# Patient Record
Sex: FEMALE | Race: OTHER | Employment: FULL TIME | URBAN - METROPOLITAN AREA
[De-identification: names, ages, dates, MRNs, and addresses within clinical notes are randomized per-mention and may not be internally consistent; named-entity substitution may affect disease eponyms.]

---

## 2017-02-10 ENCOUNTER — HOSPITAL ENCOUNTER (EMERGENCY)
Age: 22
Discharge: HOME | End: 2017-02-10
Attending: EMERGENCY MEDICINE
Payer: COMMERCIAL

## 2017-02-10 ENCOUNTER — APPOINTMENT (OUTPATIENT)
Dept: ULTRASOUND IMAGING | Age: 22
End: 2017-02-10
Attending: EMERGENCY MEDICINE
Payer: COMMERCIAL

## 2017-02-10 DIAGNOSIS — R10.9 ABDOMINAL PAIN AFFECTING PREGNANCY: Primary | ICD-10-CM

## 2017-02-10 DIAGNOSIS — O26.899 ABDOMINAL PAIN AFFECTING PREGNANCY: Primary | ICD-10-CM

## 2017-02-10 LAB
ABO + RH BLD: NORMAL
ANION GAP BLD CALC-SCNC: 10 MMOL/L (ref 7–16)
BUN SERPL-MCNC: 7 MG/DL (ref 6–23)
CALCIUM SERPL-MCNC: 8.2 MG/DL (ref 8.3–10.4)
CHLORIDE SERPL-SCNC: 107 MMOL/L (ref 98–107)
CO2 SERPL-SCNC: 25 MMOL/L (ref 21–32)
CREAT SERPL-MCNC: 0.82 MG/DL (ref 0.6–1)
ERYTHROCYTE [DISTWIDTH] IN BLOOD BY AUTOMATED COUNT: 12.9 % (ref 11.9–14.6)
GLUCOSE SERPL-MCNC: 90 MG/DL (ref 65–100)
HCG SERPL-ACNC: 221 MIU/ML (ref 0–6)
HCT VFR BLD AUTO: 39.5 % (ref 35.8–46.3)
HGB BLD-MCNC: 13.4 G/DL (ref 11.7–15.4)
MCH RBC QN AUTO: 31.2 PG (ref 26.1–32.9)
MCHC RBC AUTO-ENTMCNC: 33.9 G/DL (ref 31.4–35)
MCV RBC AUTO: 92.1 FL (ref 79.6–97.8)
PLATELET # BLD AUTO: 356 K/UL (ref 150–450)
PMV BLD AUTO: 10.2 FL (ref 10.8–14.1)
POTASSIUM SERPL-SCNC: 3.3 MMOL/L (ref 3.5–5.1)
RBC # BLD AUTO: 4.29 M/UL (ref 4.05–5.25)
SODIUM SERPL-SCNC: 142 MMOL/L (ref 136–145)
WBC # BLD AUTO: 16.5 K/UL (ref 4.3–11.1)

## 2017-02-10 PROCEDURE — 85027 COMPLETE CBC AUTOMATED: CPT | Performed by: EMERGENCY MEDICINE

## 2017-02-10 PROCEDURE — 99284 EMERGENCY DEPT VISIT MOD MDM: CPT | Performed by: EMERGENCY MEDICINE

## 2017-02-10 PROCEDURE — 80048 BASIC METABOLIC PNL TOTAL CA: CPT | Performed by: EMERGENCY MEDICINE

## 2017-02-10 PROCEDURE — 81003 URINALYSIS AUTO W/O SCOPE: CPT | Performed by: EMERGENCY MEDICINE

## 2017-02-10 PROCEDURE — 84702 CHORIONIC GONADOTROPIN TEST: CPT | Performed by: EMERGENCY MEDICINE

## 2017-02-10 PROCEDURE — 76817 TRANSVAGINAL US OBSTETRIC: CPT

## 2017-02-10 PROCEDURE — 86900 BLOOD TYPING SEROLOGIC ABO: CPT | Performed by: EMERGENCY MEDICINE

## 2017-02-11 VITALS
HEART RATE: 89 BPM | BODY MASS INDEX: 31.49 KG/M2 | RESPIRATION RATE: 18 BRPM | TEMPERATURE: 98.7 F | DIASTOLIC BLOOD PRESSURE: 90 MMHG | OXYGEN SATURATION: 99 % | SYSTOLIC BLOOD PRESSURE: 160 MMHG | WEIGHT: 189 LBS | HEIGHT: 65 IN

## 2017-02-11 NOTE — ED PROVIDER NOTES
HPI Comments: Reports positive pregnancy test today and pain in her lower abdomen when she sits up. The pain is mostly located on the left side. It is a dull ache. She is a  with irregular periods secondary to PCOS. Patient is a 19 y.o. female presenting with pelvic pain. The history is provided by the patient. Pelvic Pain    This is a new problem. The current episode started 12 to 24 hours ago. The problem occurs constantly. The problem has not changed since onset. The pain is located in the suprapubic region. The quality of the pain is aching. The pain is mild. Pertinent negatives include no fever, no diarrhea, no nausea, no vomiting, no constipation, no dysuria and no frequency. The pain is worsened by certain positions. The pain is relieved by nothing. Past Medical History:   Diagnosis Date    PCOS (polycystic ovarian syndrome)        History reviewed. No pertinent past surgical history. Family History:   Problem Relation Age of Onset    Hypertension Mother        Social History     Social History    Marital status: SINGLE     Spouse name: N/A    Number of children: N/A    Years of education: N/A     Occupational History    Not on file. Social History Main Topics    Smoking status: Never Smoker    Smokeless tobacco: Never Used    Alcohol use No      Comment: occ    Drug use: Not on file    Sexual activity: Not on file     Other Topics Concern    Not on file     Social History Narrative         ALLERGIES: Pcn [penicillins]    Review of Systems   Constitutional: Negative for chills and fever. Gastrointestinal: Positive for abdominal pain. Negative for constipation, diarrhea, nausea and vomiting. Genitourinary: Negative for dysuria and frequency. All other systems reviewed and are negative.       Vitals:    02/10/17 1954   BP: (!) 161/95   Pulse: 91   Resp: 17   Temp: 98.9 °F (37.2 °C)   SpO2: 99%   Weight: 85.7 kg (189 lb)   Height: 5' 5\" (1.651 m)            Physical Exam   Constitutional: She is oriented to person, place, and time. She appears well-developed and well-nourished. No distress. HENT:   Head: Normocephalic and atraumatic. Cardiovascular: Normal rate and regular rhythm. Pulmonary/Chest: Effort normal and breath sounds normal.   Abdominal: Soft. She exhibits no distension. There is no tenderness. There is no rebound and no guarding. She has no pain with deep palpation   Musculoskeletal: Normal range of motion. She exhibits no edema. Neurological: She is alert and oriented to person, place, and time. No cranial nerve deficit. Skin: Skin is warm and dry. She is not diaphoretic. No erythema. Psychiatric: She has a normal mood and affect. Her behavior is normal.   Nursing note and vitals reviewed. MDM  Number of Diagnoses or Management Options  Abdominal pain affecting pregnancy:   Diagnosis management comments: Her quantitative is only 200 and her ultrasound shows no IUP and no ectopic. I discussed this with her and reasons to return-bleeding or increased pain. I discussed her case with the [facility] hospitalist and recommended she follow up with her regular doctor for repeat hCG on Monday and going up appropriately and above 1 thousand a repeat ultrasound and referral to OB. Discussed this with the patient. Her exam is very benign and her urine is negative. Her WBC is elevated but she is pregnant and with no localizing symptoms I would not CT her at this time. She was told several times to return if worse.          Amount and/or Complexity of Data Reviewed  Clinical lab tests: ordered and reviewed    Risk of Complications, Morbidity, and/or Mortality  Presenting problems: moderate  Diagnostic procedures: moderate  Management options: moderate    Patient Progress  Patient progress: stable    ED Course       Procedures

## 2017-02-11 NOTE — DISCHARGE INSTRUCTIONS
Belly Pain in Pregnancy: Care Instructions  Your Care Instructions  When you're pregnant, any belly pain can be a worry. You may not want to call your doctor about every pain you have. But you don't want to miss something that is dangerous for you or your baby. Even if it feels familiar, belly pain can mean something new when you're pregnant. It's important to know when to call your doctor. It will also help to know how to care for yourself at home when your pain is not caused by anything harmful. · When belly pain is more severe or constant, see a doctor right away. · If you're sure your belly pain is a sign of labor, call your doctor. · When belly pain is brief, it's usually a normal part of pregnancy. It might be related to changes in the growing uterus. Or it could be the stretching of ligaments called round ligaments. These ligaments help support the uterus. Round ligament pain can be on either side of your belly. It can also be felt in your hips or groin. Follow-up care is a key part of your treatment and safety. Be sure to make and go to all appointments, and call your doctor if you are having problems. It's also a good idea to know your test results and keep a list of the medicines you take. How can you tell if belly pain is a sign of labor? When belly pain is caused by labor, it can feel like mild or menstrual-like cramps in your lower belly. These cramps are probably contractions. They can happen in your second or third trimester. You may also have:  · A steady, dull ache in your lower back, pelvis, or thighs. · A feeling of pressure in your pelvis or lower belly. · Changes in your vaginal discharge or a sudden release of fluid from the vagina. If you think you are in labor, call your doctor. How can you care for yourself at home? When belly pain is mild and is not a symptom of labor:  · Rest until you feel better. · Take a warm bath.   · Think about what you drink and eat:  ¨ Drink plenty of fluids. Choose water and other caffeine-free clear liquids until you feel better. ¨ Try eating small, frequent meals. If your stomach is upset, try bland, low-fat foods like plain rice, broiled chicken, toast, and yogurt. · Think about how you move if you are having brief pains from stretching of the round ligaments. ¨ Try gentle stretching. ¨ Move a little more slowly when turning in bed or getting up from a chair, so those ligaments don't stretch quickly. ¨ Lean forward a bit if you think you are going to cough or sneeze. When should you call for help? Call 911 anytime you think you may need emergency care. For example, call if:  · You have sudden, severe pain in your belly. · You have severe vaginal bleeding. Call your doctor now or seek immediate medical care if:  · You have new or worse belly pain or cramping. · You have any vaginal bleeding. · You have a fever. · You have symptoms of preeclampsia, such as:  ¨ Sudden swelling of your face, hands, or feet. ¨ New vision problems (such as dimness or blurring). ¨ A severe headache. · You think that you may be in labor. This means that you've had at least 8 contractions within 1 hour or at least 4 contractions within 20 minutes, even after you change your position and drink fluids. · You have symptoms of a urinary tract infection. These may include:  ¨ Pain or burning when you urinate. ¨ A frequent need to urinate without being able to pass much urine. ¨ Pain in the flank, which is just below the rib cage and above the waist on either side of the back. ¨ Blood in your urine. Watch closely for changes in your health, and be sure to contact your doctor if you are worried about your or your baby's health. Where can you learn more? Go to http://hosea-akosua.info/. Enter [phone] in the search box to learn more about \"Belly Pain in Pregnancy: Care Instructions. \"  Current as of: June 8, 2016  Content Version: 11.1  © 8169-4896 [employer]. Care instructions adapted under license by [employer] (which disclaims liability or warranty for this information). If you have questions about a medical condition or this instruction, always ask your healthcare professional. [place] [address] any warranty or liability for your use of this information.

## 2017-02-11 NOTE — ED TRIAGE NOTES
Pt c/o lower \"pelvic pain\" starting this morning. Pt states she found out she was pregnant this morning via home test. Pt states she was seen at urgent care this evening and they were unable to get a ultrasound. States she was sent here to get an ultrasound. Pt had blood work and urine test completed at urgent care. Pt denies any urinary problems or vaginal bleeding. Pt alert and oriented x 4. Respirations are even and unlabored. Pt appears in no acute distress at this time.

## 2017-02-14 ENCOUNTER — APPOINTMENT (OUTPATIENT)
Dept: ULTRASOUND IMAGING | Age: 22
End: 2017-02-14
Attending: EMERGENCY MEDICINE
Payer: COMMERCIAL

## 2017-02-14 ENCOUNTER — HOSPITAL ENCOUNTER (EMERGENCY)
Age: 22
Discharge: HOME | End: 2017-02-14
Attending: EMERGENCY MEDICINE
Payer: COMMERCIAL

## 2017-02-14 VITALS
TEMPERATURE: 98.5 F | WEIGHT: 187 LBS | HEART RATE: 81 BPM | HEIGHT: 65 IN | OXYGEN SATURATION: 99 % | BODY MASS INDEX: 31.16 KG/M2 | DIASTOLIC BLOOD PRESSURE: 88 MMHG | RESPIRATION RATE: 16 BRPM | SYSTOLIC BLOOD PRESSURE: 145 MMHG

## 2017-02-14 DIAGNOSIS — O20.0 THREATENED MISCARRIAGE IN EARLY PREGNANCY: ICD-10-CM

## 2017-02-14 DIAGNOSIS — R10.9 ACUTE ABDOMINAL PAIN: Primary | ICD-10-CM

## 2017-02-14 LAB
BASOPHILS # BLD AUTO: 0 K/UL (ref 0–0.2)
BASOPHILS # BLD: 0 % (ref 0–2)
DIFFERENTIAL METHOD BLD: ABNORMAL
EOSINOPHIL # BLD: 0.2 K/UL (ref 0–0.8)
EOSINOPHIL NFR BLD: 1 % (ref 0.5–7.8)
ERYTHROCYTE [DISTWIDTH] IN BLOOD BY AUTOMATED COUNT: 12.8 % (ref 11.9–14.6)
HCG SERPL-ACNC: 1606 MIU/ML (ref 0–6)
HCT VFR BLD AUTO: 39.6 % (ref 35.8–46.3)
HGB BLD-MCNC: 13.3 G/DL (ref 11.7–15.4)
IMM GRANULOCYTES # BLD: 0.1 K/UL (ref 0–0.5)
IMM GRANULOCYTES NFR BLD AUTO: 0.3 % (ref 0–5)
LYMPHOCYTES # BLD AUTO: 23 % (ref 13–44)
LYMPHOCYTES # BLD: 4.3 K/UL (ref 0.5–4.6)
MCH RBC QN AUTO: 30.7 PG (ref 26.1–32.9)
MCHC RBC AUTO-ENTMCNC: 33.6 G/DL (ref 31.4–35)
MCV RBC AUTO: 91.5 FL (ref 79.6–97.8)
MONOCYTES # BLD: 1.2 K/UL (ref 0.1–1.3)
MONOCYTES NFR BLD AUTO: 6 % (ref 4–12)
NEUTS SEG # BLD: 13 K/UL (ref 1.7–8.2)
NEUTS SEG NFR BLD AUTO: 70 % (ref 43–78)
PLATELET # BLD AUTO: 370 K/UL (ref 150–450)
PMV BLD AUTO: 10.1 FL (ref 10.8–14.1)
RBC # BLD AUTO: 4.33 M/UL (ref 4.05–5.25)
WBC # BLD AUTO: 18.9 K/UL (ref 4.3–11.1)

## 2017-02-14 PROCEDURE — 99284 EMERGENCY DEPT VISIT MOD MDM: CPT | Performed by: EMERGENCY MEDICINE

## 2017-02-14 PROCEDURE — 85025 COMPLETE CBC W/AUTO DIFF WBC: CPT | Performed by: EMERGENCY MEDICINE

## 2017-02-14 PROCEDURE — 84702 CHORIONIC GONADOTROPIN TEST: CPT | Performed by: EMERGENCY MEDICINE

## 2017-02-14 PROCEDURE — 76817 TRANSVAGINAL US OBSTETRIC: CPT

## 2017-02-14 RX ORDER — NITROFURANTOIN 25; 75 MG/1; MG/1
100 CAPSULE ORAL 2 TIMES DAILY
Qty: 14 CAP | Refills: 0 | Status: SHIPPED | OUTPATIENT
Start: 2017-02-14 | End: 2017-02-21

## 2017-02-14 NOTE — ED PROVIDER NOTES
HPI Comments: Patient states she recently found out that she was pregnant. She was seen here on Friday for abdominal pain and had a positive pregnancy test.  Her Quant was 200+ at that point. An ultrasound was done which showed no intrauterine pregnancy. She had some left lower quadrant pain at that time. It resolved and she went home. Tonight she started hurting on her right side and had some minimal vaginal spotting so she came here again. Elements of this note were created with speech recognition software. As such, there may be errors of speech recognition present. Patient is a 19 y.o. female presenting with pregnancy problem. The history is provided by the patient. Pregnancy Problem    Pertinent negatives include no fever, no nausea and no vomiting. Past Medical History:   Diagnosis Date    PCOS (polycystic ovarian syndrome)        History reviewed. No pertinent past surgical history. Family History:   Problem Relation Age of Onset    Hypertension Mother        Social History     Social History    Marital status: SINGLE     Spouse name: N/A    Number of children: N/A    Years of education: N/A     Occupational History    Not on file. Social History Main Topics    Smoking status: Never Smoker    Smokeless tobacco: Never Used    Alcohol use 0.0 oz/week     0 Standard drinks or equivalent per week      Comment: occ    Drug use: No    Sexual activity: Not on file     Other Topics Concern    Not on file     Social History Narrative         ALLERGIES: Pcn [penicillins]    Review of Systems   Constitutional: Negative for chills and fever. Gastrointestinal: Positive for abdominal pain. Negative for nausea and vomiting. All other systems reviewed and are negative.       Vitals:    02/14/17 0503   BP: (!) 162/96   Pulse: (!) 112   Resp: 16   Temp: 98.6 °F (37 °C)   SpO2: 97%   Weight: 84.8 kg (187 lb)   Height: 5' 5\" (1.651 m)            Physical Exam   Constitutional: She is oriented to person, place, and time. She appears well-developed and well-nourished. HENT:   Head: Normocephalic and atraumatic. Eyes: Conjunctivae are normal. Pupils are equal, round, and reactive to light. Neck: Normal range of motion. Neck supple. Abdominal: Soft. Bowel sounds are normal. There is no tenderness. There is no rebound. Musculoskeletal: She exhibits no edema or tenderness. Neurological: She is alert and oriented to person, place, and time. Skin: Skin is warm and dry. Psychiatric: She has a normal mood and affect. Her behavior is normal.   Nursing note and vitals reviewed. MDM  Number of Diagnoses or Management Options  Diagnosis management comments: differential diagnosis: ectopic pregnancy, threatened miscarriage, DUB  6:04 AM quantitative hCG is increasing, 1600 today. We will get an ultrasound       Amount and/or Complexity of Data Reviewed  Clinical lab tests: ordered and reviewed  Tests in the radiology section of CPT®: ordered    Risk of Complications, Morbidity, and/or Mortality  Presenting problems: moderate  Diagnostic procedures: moderate  Management options: moderate      ED Course       Procedures    The patient was observed in the ED. Patient's condition discussed with the [facility] hospitalist will evaluate in the emergency department. Results Reviewed:      Recent Results (from the past 24 hour(s))   TOTAL HCG, QT.     Collection Time: 02/13/17 11:39 AM   Result Value Ref Range    HCG, beta,  mIU/mL   AMB POC COMPLETE CBC,AUTOMATED ENTER    Collection Time: 02/13/17 11:46 AM   Result Value Ref Range    WBC (POC) 14.1 (A) 4.5 - 10.5 K/uL    RBC (POC) 4.33 4 - 6 M/uL    HGB (POC) 13.8 11 - 18 g/dL    HCT (POC) 40.7 35 - 60 %    MCV (POC) 94.1 80 - 99.9 fL    MCH (POC) 31.8 (A) 27 - 31 pg    MCHC (POC) 33.8 33 - 37 g/dL    RDW (POC) 14.3 (A) 11.6 - 13.7 %    PLATELET (POC) 979 715 - 450 K/uL    MPV (POC) 7.8 7.8 - 11 fL    LYMPHOCYTES (POC) 19.6 (A) 20.5 - 51.1 % ABS. LYMPHS (POC) 2.8 1.2 - 3.4 K/uL    MONOCYTES (POC) 4.4 1.7 - 9.3 %    ABS. MONOS (POC) 0.6 0.1 - 0.6 K/uL    GRANULOCYTES (POC) 76.0 (A) 42.2 - 75.2 %    ABS. GRANS (POC) 10.7 (A) 1.4 - 6.5 K/uL   AMB POC URINALYSIS DIP STICK MANUAL W/ MICRO    Collection Time: 02/13/17 11:58 AM   Result Value Ref Range    Color (UA POC) Yellow     Clarity (UA POC) Slightly Cloudy     Glucose (UA POC) Negative Negative    Bilirubin (UA POC) Negative Negative    Ketones (UA POC) Negative Negative    Specific gravity (UA POC) 1.025 1.001 - 1.035    Blood (UA POC) 1+ Negative    pH (UA POC) 5.0 4.6 - 8.0    Protein (UA POC) Negative Negative mg/dL    Urobilinogen (UA POC) 0.2 mg/dL 0.2 - 1    Nitrites (UA POC) Negative Negative    Leukocyte esterase (UA POC) Trace Negative    WBC 3-6     RBC 0-3     Epithelial Cells 10-20     Casts 0-3     Bacteria (UA POC) Trace Negative    Crystals (UA POC) Negative Negative    Amorphous Crystals 1+     Other: none Negative   CBC WITH AUTOMATED DIFF    Collection Time: 02/14/17  5:10 AM   Result Value Ref Range    WBC 18.9 (H) 4.3 - 11.1 K/uL    RBC 4.33 4.05 - 5.25 M/uL    HGB 13.3 11.7 - 15.4 g/dL    HCT 39.6 35.8 - 46.3 %    MCV 91.5 79.6 - 97.8 FL    MCH 30.7 26.1 - 32.9 PG    MCHC 33.6 31.4 - 35.0 g/dL    RDW 12.8 11.9 - 14.6 %    PLATELET 391 744 - 724 K/uL    MPV 10.1 (L) 10.8 - 14.1 FL    DF AUTOMATED      NEUTROPHILS 70 43 - 78 %    LYMPHOCYTES 23 13 - 44 %    MONOCYTES 6 4.0 - 12.0 %    EOSINOPHILS 1 0.5 - 7.8 %    BASOPHILS 0 0.0 - 2.0 %    IMMATURE GRANULOCYTES 0.3 0.0 - 5.0 %    ABS. NEUTROPHILS 13.0 (H) 1.7 - 8.2 K/UL    ABS. LYMPHOCYTES 4.3 0.5 - 4.6 K/UL    ABS. MONOCYTES 1.2 0.1 - 1.3 K/UL    ABS. EOSINOPHILS 0.2 0.0 - 0.8 K/UL    ABS. BASOPHILS 0.0 0.0 - 0.2 K/UL    ABS. IMM. GRANS. 0.1 0.0 - 0.5 K/UL   TOTAL HCG, QT.     Collection Time: 02/14/17  5:10 AM   Result Value Ref Range    Beta HCG, QT 1606 (H) 0.0 - 6.0 MIU/ML       US PREG UTS LTD   Final Result   IMPRESSION: Negative for ultrasonographic evidence of intrauterine pregnancy. No extraovarian adnexal masses. Early follow-up as clinically indicated   recommended.

## 2017-02-14 NOTE — H&P
DOS 16  CC abdominal pain/ pregnancy    HPI:  19 y.o.  G1 at by  Presents with c/o abdominal pain  Pt presented to ED on 2/10 with left pelvic pain and beta quant of 200; today she has right pelvic pain and beta quant of 1600. Pain is dull and sharp, radiating down leg, intermittent, worse with lying  Down, cant sleep with this, no GI sx, no fevers, able to walk, appetite is normal    US images reviewed by myself- thickened endometrium without sign of GS, nl ovaries, no free fluid, no ectopic seen. Pt desires pregnancy, no h/o PID, no h/o STI, no h/o ectopic. Was not on any contraception, FOB not currently involved. Has some dysuria, some hematuria, was evaluated yesterday and UA was done    ROS  GEN neg  HEENT  neg  GI neg   neg  CVneg  GYN neg    OBHx  OB History    Para Term  AB SAB TAB Ectopic Multiple Living   1               # Outcome Date GA Lbr Fly/2nd Weight Sex Delivery Anes PTL Lv   1 Current                     GYNhx:  +h/o abnl pap - nl f/u pap  No h/o STI  PCO- irregular cycles, has not been on any contraception, but continues on metformin     PMHx:  Past Medical History   Diagnosis Date    PCOS (polycystic ovarian syndrome)    Has h/o UTI (?) or IC       PSHx:  History reviewed. No pertinent past surgical history. FHX:  Family History   Problem Relation Age of Onset    Hypertension Mother        SHx:  Social History     Occupational History    Not on file. Social History Main Topics    Smoking status: Never Smoker    Smokeless tobacco: Never Used    Alcohol use 0.0 oz/week     0 Standard drinks or equivalent per week      Comment: occ    Drug use: No    Sexual activity: Not on file     History   Sexual Activity    Sexual activity: Not on file   one partner      Allergies  Allergies   Allergen Reactions    Pcn [Penicillins] Swelling     TONGUE SWELLS       Med  Prior to Admission medications    Medication Sig Start Date End Date Taking?  Authorizing Provider   FLUoxetine (PROZAC) 20 mg capsule Take 20 mg by mouth daily. 17  Yes Historical Provider   amitriptyline (ELAVIL) 50 mg tablet Take 50 mg by mouth nightly. 17  Yes Historical Provider   metFORMIN (GLUCOPHAGE) 500 mg tablet TAKE 1 TABLET BY MOUTH TWICE A DAY WITH MEAL FOR POLYCYSTIC OVARIAN SYNDROME 16  Yes Tammy Vickers, CHRISTINE         PE  Patient Vitals for the past 24 hrs:   BP   17 0702 140/90   17 0503 (!) 162/96     Temp (24hrs), Av.6 °F (37 °C), Min:98.6 °F (37 °C), Max:98.6 °F (37 °C)    GEN NAD  LUNGS  nonl labored respirations  ABD soft, TTP RLQ, no rebound/guarding, fundus not palpable  EXT no edema   deferred    US images reviewd personally, thickened ES, nl ovaries bilaterally, no FF,m no GS, no ectopic    Prenatal Labs  UA reviewed  O+    A/  19 y.o. G1 with early pregnancy with unknown location  Plan/   1. PUL- Advised that she is just now at discriminatory zone and may need a few more days for IUP to become obvious. No RF for ectopic, desired pregnancy. Will repeat US end of this week, has appt already. Pt given exctopic precautions and is reliable. 2. Early pregnancy- advsied will need to continue metformin, stop elavil, will need early 1hr and ASA 81mg starting at 12 weeks  3. Likely UTI- could be cause of mild-mod pain, will do macrobid BID x 7 days. Thank you for the consult, will f/u with primary OB and for US this week to confirm viability.        Teresa Mckeon MD

## 2017-02-14 NOTE — DISCHARGE INSTRUCTIONS
Threatened Miscarriage: Care Instructions  Your Care Instructions    Some women have light spotting or bleeding during the first 12 weeks of pregnancy. In some cases this is normal. Light spotting or bleeding can also be a sign of a possible loss of the pregnancy. This is called a threatened miscarriage. At this point, the doctor may not be able to tell if your vaginal bleeding is normal or is a sign of a miscarriage. In early pregnancy, things such as stress, exercise, and sex do not cause miscarriage. You may be worried or upset about the possibility of losing your pregnancy. But do not blame yourself. There is no treatment to stop a threatened miscarriage. If you do have a miscarriage, there was nothing you could have done to prevent it. A miscarriage usually means that the pregnancy is not developing normally. The doctor has checked you carefully, but problems can develop later. If you notice any problems or new symptoms, get medical treatment right away. Follow-up care is a key part of your treatment and safety. Be sure to make and go to all appointments, and call your doctor if you are having problems. It's also a good idea to know your test results and keep a list of the medicines you take. How can you care for yourself at home? · If you do have a miscarriage, you will probably have some vaginal bleeding for 1 to 2 weeks. Use pads instead of tampons. · Take acetaminophen (Tylenol) for cramps. Read and follow all instructions on the label. · Do not take two or more pain medicines at the same time unless the doctor told you to. Many pain medicines have acetaminophen, which is Tylenol. Too much acetaminophen (Tylenol) can be harmful. · Do not have sex until your doctor says it is okay. · Get lots of rest over the next several days. · You may do your normal activities if you feel well enough to do them. But do not do any heavy exercise until your doctor says it is okay.   · Eat a balanced diet that is high in iron and vitamin C. Foods rich in iron include red meat, shellfish, eggs, beans, and leafy green vegetables. Foods high in vitamin C include citrus fruits, tomatoes, and broccoli. Talk to your doctor about whether you need to take iron pills or a multivitamin. · Do not drink alcohol or use tobacco or illegal drugs. · Do not smoke. If you need help quitting, talk to your doctor about stop-smoking programs and medicines. These can increase your chances of quitting for good. When should you call for help? Call 911 anytime you think you may need emergency care. For example, call if:  · You have sudden, severe pain in your belly or pelvis. · You passed out (lost consciousness). · You have severe vaginal bleeding. Call your doctor now or seek immediate medical care if:  · You are dizzy or lightheaded, or you feel like you may faint. · You have new or increased pain in your belly or pelvis. · Your vaginal bleeding is getting worse. · You have increased pain in the vaginal area. · You have a fever. · You think you may have passed tissue. Save any tissue that you pass. Take it to your doctor's office as soon as you can. Watch closely for changes in your health, and be sure to contact your doctor if:  · You have new or worse vaginal discharge. · You do not get better as expected. Where can you learn more? Go to http://hosea-akosua.info/. Enter B962 in the search box to learn more about \"Threatened Miscarriage: Care Instructions. \"  Current as of: May 30, 2016  Content Version: 11.1  © 8594-7465 Healthwise, Incorporated. Care instructions adapted under license by [employer] (which disclaims liability or warranty for this information). If you have questions about a medical condition or this instruction, always ask your healthcare professional. [address] any warranty or liability for your use of this information.          Abdominal Pain: Care Instructions  Your Care Instructions    Abdominal pain has many possible causes. Some aren't serious and get better on their own in a few days. Others need more testing and treatment. If your pain continues or gets worse, you need to be rechecked and may need more tests to find out what is wrong. You may need surgery to correct the problem. Don't ignore new symptoms, such as fever, nausea and vomiting, urination problems, pain that gets worse, and dizziness. These may be signs of a more serious problem. Your doctor may have recommended a follow-up visit in the next 8 to 12 hours. If you are not getting better, you may need more tests or treatment. The doctor has checked you carefully, but problems can develop later. If you notice any problems or new symptoms, get medical treatment right away. Follow-up care is a key part of your treatment and safety. Be sure to make and go to all appointments, and call your doctor if you are having problems. It's also a good idea to know your test results and keep a list of the medicines you take. How can you care for yourself at home? · Rest until you feel better. · To prevent dehydration, drink plenty of fluids, enough so that your urine is light yellow or clear like water. Choose water and other caffeine-free clear liquids until you feel better. If you have kidney, heart, or liver disease and have to limit fluids, talk with your doctor before you increase the amount of fluids you drink. · If your stomach is upset, eat mild foods, such as rice, dry toast or crackers, bananas, and applesauce. Try eating several small meals instead of two or three large ones. · Wait until 48 hours after all symptoms have gone away before you have spicy foods, alcohol, and drinks that contain caffeine. · Do not eat foods that are high in fat. · Avoid anti-inflammatory medicines such as aspirin, ibuprofen (Advil, Motrin), and naproxen (Aleve). These can cause stomach upset.  Talk to your doctor if you take daily aspirin for another health problem. When should you call for help? Call 911 anytime you think you may need emergency care. For example, call if:  · You passed out (lost consciousness). · You pass maroon or very bloody stools. · You vomit blood or what looks like coffee grounds. · You have new, severe belly pain. Call your doctor now or seek immediate medical care if:  · Your pain gets worse, especially if it becomes focused in one area of your belly. · You have a new or higher fever. · Your stools are black and look like tar, or they have streaks of blood. · You have unexpected vaginal bleeding. · You have symptoms of a urinary tract infection. These may include:  ¨ Pain when you urinate. ¨ Urinating more often than usual.  ¨ Blood in your urine. · You are dizzy or lightheaded, or you feel like you may faint. Watch closely for changes in your health, and be sure to contact your doctor if:  · You are not getting better after 1 day (24 hours). Where can you learn more? Go to http://hoseaYokeakosua.info/. Enter R074 in the search box to learn more about \"Abdominal Pain: Care Instructions. \"  Current as of: May 27, 2016  Content Version: 11.1  © 2599-3811 [employer]. Care instructions adapted under license by [employer] (which disclaims liability or warranty for this information). If you have questions about a medical condition or this instruction, always ask your healthcare professional. [place] [address] any warranty or liability for your use of this information. Suspected Ectopic Pregnancy: Care Instructions  Your Care Instructions    Your doctor thinks you may have an ectopic pregnancy. This means that a fertilized egg has attached to a place outside the uterus. In most of these cases, the egg grows in a fallopian tube. This is also called a tubal pregnancy.  In rare cases, the egg grows in an ovary or another place in the belly. An ectopic pregnancy cannot develop normally. It can be painful and very dangerous. In some cases, the ectopic pregnancy ends and the body absorbs it over time. If so, treatment is not needed. Your doctor is sending you home to watch for belly pain or bleeding. Call your doctor right away if you have any new or increased pain or bleeding. You also may need to come back for more tests. If an ectopic pregnancy does not end on its own, the only treatment is medicine or surgery to end the pregnancy. An ectopic pregnancy can be very upsetting. But you should not blame yourself. You could not have done anything to prevent it. The doctor has checked you carefully. But problems can develop later. If you notice any problems or new symptoms, get medical treatment right away. Follow-up care is a key part of your treatment and safety. Be sure to make and go to all appointments, and call your doctor if you are having problems. It's also a good idea to know your test results and keep a list of the medicines you take. How can you care for yourself at home? · Rest when you feel tired. You may be more tired than normal for a few weeks. · If you are treated with methotrexate:  ¨ Your doctor will let you know if you can take over-the-counter pain medicine, such as acetaminophen (Tylenol), ibuprofen (Advil, Motrin), or naproxen (Aleve). Read and follow all instructions on the label. ¨ Do not take two or more pain medicines at the same time unless the doctor told you to. Many pain medicines have acetaminophen, which is Tylenol. Too much acetaminophen (Tylenol) can be harmful. ¨ Do not drink alcohol. ¨ Do not take vitamins that contain folic acid, such as prenatal vitamins. · Use pads instead of tampons until your doctor says it is okay. · It may help to talk with family, friends, or a counselor if you are having trouble dealing with the loss of your pregnancy.  If you feel sad or depressed for longer than 2 weeks, talk to a counselor or your doctor. · Do not have sex until your doctor says it is okay. · Talk with your doctor about any future pregnancy plans. When should you call for help? Call 911 anytime you think you may need emergency care. For example, call if:  · You have sudden, severe pain in your belly or pelvis. · You passed out (lost consciousness). · You have severe vaginal bleeding. Call your doctor now or seek immediate medical care if:  · You are dizzy or lightheaded, or you feel like you may faint. · You have new or increased pain in your belly or pelvis. · Your vaginal bleeding is getting worse. · You have increased pain in the vaginal area. · You have a fever. Watch closely for changes in your health, and be sure to contact your doctor if:  · You have new or worse vaginal discharge. · You do not get better as expected. Where can you learn more? Go to http://hosea-akosua.info/. Enter Q902 in the search box to learn more about \"Suspected Ectopic Pregnancy: Care Instructions. \"  Current as of: May 30, 2016  Content Version: 11.1  © 7059-8203 [employer]. Care instructions adapted under license by [employer] (which disclaims liability or warranty for this information). If you have questions about a medical condition or this instruction, always ask your healthcare professional. [address] any warranty or liability for your use of this information.

## 2017-02-14 NOTE — ED NOTES
Advised pt that she is going to have an ultrasound and needs to have a full bladder. Ice water given.

## 2017-02-14 NOTE — LETTER
[facility] EMERGENCY DEPT 
[facility] [address] [facility] [id number] [phone] Work/School Note Date: 2/14/2017 To Whom It May concern: Moises Forte was seen and treated today in the emergency room by the following provider(s): 
Attending Provider: Opal Rodriguez MD.   
 
Moises Forte may return to work on 2/15/17. Sincerely, Carol Ledbetter RN

## 2017-02-14 NOTE — ED NOTES
Pt returned from ultrasound. No complaints verbalized. Informed pt we are waiting on results of ultrasound.

## 2017-02-14 NOTE — ED NOTES
Pt presents to ER for pain to R LQ that began tonight, she was seen at [facility] on Fri and was Dx w/ very early pregnancy w/ . Pt grew concerned that the pain moved from the L LQ to her R now. Pt stated that she has not taken any of her routine meds since determining pregnancy on Fri including her metformin for her PCOS. Was seen by her PCP yesterday but states that her HCG was not back yet and wanted to be checked, MD notified, orders placed.

## 2017-05-19 ENCOUNTER — HOSPITAL ENCOUNTER (EMERGENCY)
Age: 22
Discharge: HOME | End: 2017-05-19
Attending: EMERGENCY MEDICINE
Payer: COMMERCIAL

## 2017-05-19 VITALS
WEIGHT: 192 LBS | SYSTOLIC BLOOD PRESSURE: 125 MMHG | HEIGHT: 65 IN | OXYGEN SATURATION: 100 % | DIASTOLIC BLOOD PRESSURE: 80 MMHG | BODY MASS INDEX: 31.99 KG/M2 | RESPIRATION RATE: 18 BRPM | TEMPERATURE: 98.1 F | HEART RATE: 75 BPM

## 2017-05-19 DIAGNOSIS — S05.02XA CORNEAL ABRASION, LEFT, INITIAL ENCOUNTER: ICD-10-CM

## 2017-05-19 DIAGNOSIS — H20.00 ACUTE IRITIS OF LEFT EYE: Primary | ICD-10-CM

## 2017-05-19 PROCEDURE — 99284 EMERGENCY DEPT VISIT MOD MDM: CPT | Performed by: EMERGENCY MEDICINE

## 2017-05-19 PROCEDURE — 74011000250 HC RX REV CODE- 250: Performed by: EMERGENCY MEDICINE

## 2017-05-19 PROCEDURE — 74011250637 HC RX REV CODE- 250/637: Performed by: EMERGENCY MEDICINE

## 2017-05-19 RX ORDER — IBUPROFEN 800 MG/1
800 TABLET ORAL
Status: COMPLETED | OUTPATIENT
Start: 2017-05-19 | End: 2017-05-19

## 2017-05-19 RX ORDER — IBUPROFEN 800 MG/1
800 TABLET ORAL
Qty: 20 TAB | Refills: 0 | Status: SHIPPED | OUTPATIENT
Start: 2017-05-19 | End: 2017-05-26

## 2017-05-19 RX ORDER — TROPICAMIDE 10 MG/ML
2 SOLUTION/ DROPS OPHTHALMIC
Status: COMPLETED | OUTPATIENT
Start: 2017-05-19 | End: 2017-05-19

## 2017-05-19 RX ORDER — TETRACAINE HYDROCHLORIDE 5 MG/ML
1 SOLUTION OPHTHALMIC
Status: COMPLETED | OUTPATIENT
Start: 2017-05-19 | End: 2017-05-19

## 2017-05-19 RX ORDER — HYDROCODONE BITARTRATE AND ACETAMINOPHEN 5; 325 MG/1; MG/1
1-2 TABLET ORAL
Qty: 20 TAB | Refills: 0 | Status: SHIPPED | OUTPATIENT
Start: 2017-05-19 | End: 2017-10-16 | Stop reason: ALTCHOICE

## 2017-05-19 RX ORDER — ERYTHROMYCIN 5 MG/G
OINTMENT OPHTHALMIC
Status: COMPLETED | OUTPATIENT
Start: 2017-05-19 | End: 2017-05-19

## 2017-05-19 RX ADMIN — TETRACAINE HYDROCHLORIDE 1 DROP: 5 SOLUTION OPHTHALMIC at 04:16

## 2017-05-19 RX ADMIN — FLUORESCEIN SODIUM 1 STRIP: 1 STRIP OPHTHALMIC at 04:16

## 2017-05-19 RX ADMIN — TROPICAMIDE 2 DROP: 10 SOLUTION/ DROPS OPHTHALMIC at 04:46

## 2017-05-19 RX ADMIN — ERYTHROMYCIN: 5 OINTMENT OPHTHALMIC at 05:02

## 2017-05-19 RX ADMIN — IBUPROFEN 800 MG: 800 TABLET, FILM COATED ORAL at 04:40

## 2017-05-19 NOTE — ED PROVIDER NOTES
Patient is a 19 y.o. female presenting with eye injury. The history is provided by the patient. Eye Injury    This is a new problem. The current episode started 6 to 12 hours ago. The problem occurs constantly. The problem has been rapidly worsening. The left eye is affected. The injury mechanism was a direct trauma. The pain is moderate. There is history of trauma (patient was changing a light bulb on a floor lamp when it fell striking her in the left eye) to the eye. Associated symptoms include blurred vision, decreased vision, foreign body sensation, photophobia and pain. Pertinent negatives include no discharge and no eye redness. She has tried nothing for the symptoms. Past Medical History:   Diagnosis Date    Abnormal Papanicolaou smear of cervix     no treatment    Depression     History of chicken pox     Migraines     PCOS (polycystic ovarian syndrome)        Past Surgical History:   Procedure Laterality Date    HX WISDOM TEETH EXTRACTION           Family History:   Problem Relation Age of Onset    Hypertension Father 37   Donia [place] Elevated Lipids Father     Cancer Father      melanoma x 2    Diabetes Paternal Grandfather        Social History     Social History    Marital status: SINGLE     Spouse name: N/A    Number of children: N/A    Years of education: N/A     Occupational History    Not on file. Social History Main Topics    Smoking status: Never Smoker    Smokeless tobacco: Never Used    Alcohol use 0.0 oz/week     0 Standard drinks or equivalent per week      Comment: occ    Drug use: No    Sexual activity: Yes     Partners: Male     Birth control/ protection: None     Other Topics Concern    Not on file     Social History Narrative         ALLERGIES: Pcn [penicillins]    Review of Systems   Eyes: Positive for blurred vision, photophobia and pain. Negative for discharge and redness.        Vitals:    05/19/17 0414   BP: (!) 155/100   Pulse: 79   Resp: 18   Temp: 98.1 °F (36.7 °C)   SpO2: 99%   Weight: 87.1 kg (192 lb)   Height: 5' 5\" (1.651 m)            Physical Exam   Constitutional: She is oriented to person, place, and time. She appears well-developed and well-nourished. She appears distressed. HENT:   Head: Normocephalic and atraumatic. Eyes: Conjunctivae and EOM are normal. Pupils are equal, round, and reactive to light. Right eye exhibits no discharge. Left eye exhibits no discharge. Consensual photophobia noted   Neck: Normal range of motion. Neck supple. Pulmonary/Chest: Effort normal. No respiratory distress. Musculoskeletal: Normal range of motion. Neurological: She is alert and oriented to person, place, and time. She has normal strength. She exhibits normal muscle tone. cni 2-12 grossly  Nl gait,  Nl speech     Skin: Skin is warm and dry. No rash noted. She is not diaphoretic. Psychiatric: She has a normal mood and affect. Her behavior is normal.   Nursing note and vitals reviewed. MDM  Number of Diagnoses or Management Options  Acute iritis of left eye:   Corneal abrasion, left, initial encounter:   Diagnosis management comments: Medical decision making note:  Acute iritis accompanying left corneal abrasion  Dilated with tropicamide, 1%, 2 drops, ×3 treatments 5 minutes apart. ( 6 drops total) good mydriasis achieved  Erythromycin eye ointment, and Motrin 800 administered in the ER  Norco 5/and Motrin 800 prescribed  Follow-up with her optometrist or ophthalmologist today. Note provided  This concludes the \"medical decision making note\" part of this emergency department visit note.       ED Course       Procedures

## 2017-05-19 NOTE — ED NOTES
Gave 3rd set of 2 drops of tropicamide.  Pt verbalized understanding of discharge instructions and prescriptions, signed form and ambualted out in nad

## 2017-05-19 NOTE — ED TRIAGE NOTES
Pt presents to ER after a hanging lamp fell omn her face when changing a light bulb at 2300 tonight. Pt states that she was hurting after the injury but woke 30 min ago w/ increased pain, no OTC meds attempted prior to bed tonight.

## 2017-05-19 NOTE — DISCHARGE INSTRUCTIONS
Apply antibiotic ointment to left eyes demonstrated 3 times a day  Follow-up with your optometrist, or our ophthalmologist, today  Pain medicines as needed       Corneal Scratches: Care Instructions  Your Care Instructions    The cornea is the clear surface that covers the front of the eye. When a speck of dirt, a wood chip, an insect, or another object flies into your eye, it can cause a painful scratch on the cornea. Wearing contact lenses too long or rubbing your eyes can also scratch the cornea. Small scratches usually heal in a day or two. Deeper scratches may take longer. If you have had a foreign object removed from your eye or you have a corneal scratch, you will need to watch for infection and vision problems while your eye heals. Follow-up care is a key part of your treatment and safety. Be sure to make and go to all appointments, and call your doctor if you are having problems. Its also a good idea to know your test results and keep a list of the medicines you take. How can you care for yourself at home? · The doctor probably used a medicine during your exam to numb your eye. When it wears off in 30 to 60 minutes, your eye pain may come back. Take pain medicines exactly as directed. ¨ If the doctor gave you a prescription medicine for pain, take it as prescribed. ¨ If you are not taking a prescription pain medicine, ask your doctor if you can take an over-the-counter medicine. ¨ Do not take two or more pain medicines at the same time unless the doctor told you to. Many pain medicines have acetaminophen, which is Tylenol. Too much acetaminophen (Tylenol) can be harmful. · Do not rub your injured eye. Rubbing can make it worse. · Use the prescribed eyedrops or ointment as directed. Be sure the dropper or bottle tip is clean. To put in eyedrops or ointment:  ¨ Tilt your head back, and pull your lower eyelid down with one finger. ¨ Drop or squirt the medicine inside the lower lid.   ¨ Close your eye for 30 to 60 seconds to let the drops or ointment move around. ¨ Do not touch the ointment or dropper tip to your eyelashes or any other surface. · Do not use your contact lens in your hurt eye until your doctor says you can. Also, do not wear eye makeup until your eye has healed. · Do not drive if you have blurred vision. · Bright light may hurt. Sunglasses can help. · To prevent eye injuries in the future, wear safety glasses or goggles when you work with machines or tools, mow the lawn, or ride a bike or motorcycle. When should you call for help? Call your doctor now or seek immediate medical care if:  · You have any changes in your vision, flashes of light, or floaters (shadows or dark objects that float across your field of vision). · Pain or redness gets worse, or there is yellow, green, or bloody pus coming from the eye. · You have a fever. Watch closely for changes in your health, and be sure to contact your doctor if you have any problems. Where can you learn more? Go to http://hosea-akosua.info/. Enter C643 in the search box to learn more about \"Corneal Scratches: Care Instructions. \"  Current as of: May 23, 2016  Content Version: 11.2  © 8427-5699 [employer]. Care instructions adapted under license by [employer] (which disclaims liability or warranty for this information). If you have questions about a medical condition or this instruction, always ask your healthcare professional. [place] [address] any warranty or liability for your use of this information. Iritis: Care Instructions  Your Care Instructions    Iritis is an inflammation of the colored part of the eye. This part of the eye is called the iris. Iritis can cause redness and pain. It can make your eyes more sensitive to light. And it may make your pupils different sizes. Iritis is most often treated with prescription eyedrops.  Treatment can usually prevent long-term problems with vision. Iritis usually lasts 6 to 8 weeks. You will need follow-up care with an eye doctor (ophthalmologist). Uveitis (say \"you-vee-EYE-tus\") and iridocyclitis (say \"lbh-ru-mxm-ijeoma-KLY-tus\") are other terms used to refer to this problem. Follow-up care is a key part of your treatment and safety. Be sure to make and go to all appointments, and call your doctor if you are having problems. It's also a good idea to know your test results and keep a list of the medicines you take. How can you care for yourself at home? · If the doctor gave you eyedrops, use them exactly as directed. Use the medicine for as long as instructed, even if your eye starts to look better soon. Call your doctor if you think you are having a problem with your eyedrops. Wash your hands well before and after you put in eyedrops. · To put in eyedrops or ointment:  ¨ Tilt your head back, and pull your lower eyelid down with one finger. ¨ Drop or squirt the medicine inside the lower lid. ¨ Close your eye for 30 to 60 seconds to let the drops or ointment move around. ¨ Do not touch the ointment or dropper tip to your eyelashes or any other surface. · Take an over-the-counter pain medicine, such as acetaminophen (Tylenol), ibuprofen (Advil, Motrin), or naproxen (Aleve). Read and follow all instructions on the label. · Make sure you go to all of your follow-up appointments. You will need a complete eye exam from an eye doctor. When should you call for help? Call your doctor now or seek immediate medical care if:  · You have new or increasing eye pain. · You have vision changes in either eye. Watch closely for changes in your health, and be sure to contact your doctor if:  · You have new or worse symptoms. · You do not get better as expected. Where can you learn more? Go to http://hosea-akosua.info/. Enter B112 in the search box to learn more about \"Iritis: Care Instructions. \"  Current as of: May 23, 2016  Content Version: 11.2  © 7433-8517 [employer]. Care instructions adapted under license by [employer] (which disclaims liability or warranty for this information). If you have questions about a medical condition or this instruction, always ask your healthcare professional. [address] any warranty or liability for your use of this information.

## 2017-05-19 NOTE — LETTER
[address] [facility] EMERGENCY DEPT 
[address] [place] [id number] 
[phone] Work/School Note Date: 5/19/2017 To Whom It May concern: Jose Guadalupe Landry was seen and treated today in the emergency room by the following provider(s): 
Attending Provider: Julita Parekh MD.   
 
Jose Guadalupe Landry may return to work on 5/22/17.  
 
Sincerely, 
 
 
 
 
Julita Parekh MD

## 2018-02-08 PROBLEM — G47.09 OTHER INSOMNIA: Status: ACTIVE | Noted: 2018-02-08

## 2018-02-08 PROBLEM — F32.A ANXIETY AND DEPRESSION: Status: ACTIVE | Noted: 2018-02-08

## 2018-02-08 PROBLEM — F90.0 ATTENTION DEFICIT HYPERACTIVITY DISORDER (ADHD), PREDOMINANTLY INATTENTIVE TYPE: Status: ACTIVE | Noted: 2018-02-08

## 2018-02-08 PROBLEM — G43.909 MIGRAINES: Status: ACTIVE | Noted: 2018-02-08

## 2018-02-08 PROBLEM — F41.9 ANXIETY AND DEPRESSION: Status: ACTIVE | Noted: 2018-02-08

## 2018-04-30 PROBLEM — J30.2 SEASONAL ALLERGIC RHINITIS: Status: ACTIVE | Noted: 2018-04-30

## 2018-05-21 PROBLEM — M47.816 FACET ARTHROPATHY, LUMBAR: Status: ACTIVE | Noted: 2018-05-21

## 2018-05-21 PROBLEM — M51.27 PROTRUSION OF INTERVERTEBRAL DISC OF LUMBOSACRAL REGION: Status: ACTIVE | Noted: 2018-05-21

## 2018-07-09 PROBLEM — I10 ESSENTIAL HYPERTENSION: Status: ACTIVE | Noted: 2018-07-09

## 2018-07-12 PROBLEM — E06.3 HASHIMOTO'S THYROIDITIS: Status: ACTIVE | Noted: 2018-07-12

## 2018-07-16 PROBLEM — D72.829 LEUKOCYTOSIS: Status: ACTIVE | Noted: 2018-07-16

## 2018-08-09 ENCOUNTER — HOSPITAL ENCOUNTER (OUTPATIENT)
Dept: LAB | Age: 23
Discharge: HOME | End: 2018-08-09
Payer: COMMERCIAL

## 2018-08-09 DIAGNOSIS — J30.2 SEASONAL ALLERGIC RHINITIS, UNSPECIFIED TRIGGER: ICD-10-CM

## 2018-08-09 DIAGNOSIS — E06.3 HASHIMOTO'S THYROIDITIS: ICD-10-CM

## 2018-08-09 DIAGNOSIS — D72.829 LEUKOCYTOSIS, UNSPECIFIED TYPE: ICD-10-CM

## 2018-08-09 LAB
ALBUMIN SERPL-MCNC: 3.5 G/DL (ref 3.5–5)
ALBUMIN/GLOB SERPL: 0.9 {RATIO} (ref 1.2–3.5)
ALP SERPL-CCNC: 86 U/L (ref 50–136)
ALT SERPL-CCNC: 26 U/L (ref 12–65)
ANION GAP SERPL CALC-SCNC: 6 MMOL/L (ref 7–16)
APPEARANCE UR: ABNORMAL
AST SERPL-CCNC: 15 U/L (ref 15–37)
BACTERIA URNS QL MICRO: ABNORMAL /HPF
BASOPHILS # BLD: 0.1 K/UL (ref 0–0.2)
BASOPHILS NFR BLD: 1 % (ref 0–2)
BILIRUB SERPL-MCNC: 0.3 MG/DL (ref 0.2–1.1)
BILIRUB UR QL: NEGATIVE
BUN SERPL-MCNC: 11 MG/DL (ref 6–23)
CALCIUM SERPL-MCNC: 9 MG/DL (ref 8.3–10.4)
CASTS URNS QL MICRO: 0 /LPF
CHLORIDE SERPL-SCNC: 107 MMOL/L (ref 98–107)
CO2 SERPL-SCNC: 24 MMOL/L (ref 21–32)
COLOR UR: YELLOW
CREAT SERPL-MCNC: 0.94 MG/DL (ref 0.6–1)
CRP SERPL-MCNC: 0.8 MG/DL (ref 0–0.9)
CRYSTALS URNS QL MICRO: 0 /LPF
DAT POLY-SP REAG RBC QL: NORMAL
DIFFERENTIAL METHOD BLD: NORMAL
EOSINOPHIL # BLD: 0.1 K/UL (ref 0–0.8)
EOSINOPHIL NFR BLD: 1 % (ref 0.5–7.8)
EPI CELLS #/AREA URNS HPF: ABNORMAL /HPF
ERYTHROCYTE [DISTWIDTH] IN BLOOD BY AUTOMATED COUNT: 12.2 % (ref 11.9–14.6)
ERYTHROCYTE [SEDIMENTATION RATE] IN BLOOD: 22 MM/HR (ref 0–20)
FERRITIN SERPL-MCNC: 47 NG/ML (ref 8–388)
GLOBULIN SER CALC-MCNC: 4.1 G/DL (ref 2.3–3.5)
GLUCOSE SERPL-MCNC: 108 MG/DL (ref 65–100)
GLUCOSE UR STRIP.AUTO-MCNC: NEGATIVE MG/DL
HCT VFR BLD AUTO: 41.5 % (ref 35.8–46.3)
HGB BLD-MCNC: 14.1 G/DL (ref 11.7–15.4)
HGB UR QL STRIP: ABNORMAL
IGA SERPL-MCNC: 182 MG/DL (ref 70–400)
IGG SERPL-MCNC: 1240 MG/DL (ref 700–1600)
IGM SERPL-MCNC: 146 MG/DL (ref 40–230)
IMM GRANULOCYTES # BLD: 0 K/UL (ref 0–0.5)
IMM GRANULOCYTES NFR BLD AUTO: 0 % (ref 0–5)
IRON SATN MFR SERPL: 25 %
IRON SERPL-MCNC: 85 UG/DL (ref 35–150)
KETONES UR QL STRIP.AUTO: NEGATIVE MG/DL
LDH SERPL L TO P-CCNC: 140 U/L (ref 100–190)
LEUKOCYTE ESTERASE UR QL STRIP.AUTO: ABNORMAL
LYMPHOCYTES # BLD: 2.4 K/UL (ref 0.5–4.6)
LYMPHOCYTES NFR BLD: 27 % (ref 13–44)
Lab: NORMAL
MCH RBC QN AUTO: 30.5 PG (ref 26.1–32.9)
MCHC RBC AUTO-ENTMCNC: 34 G/DL (ref 31.4–35)
MCV RBC AUTO: 89.6 FL (ref 79.6–97.8)
MONOCYTES # BLD: 0.5 K/UL (ref 0.1–1.3)
MONOCYTES NFR BLD: 6 % (ref 4–12)
MUCOUS THREADS URNS QL MICRO: 0 /LPF
NEUTS SEG # BLD: 5.9 K/UL (ref 1.7–8.2)
NEUTS SEG NFR BLD: 66 % (ref 43–78)
NITRITE UR QL STRIP.AUTO: NEGATIVE
NRBC # BLD: 0.01 K/UL (ref 0–0.2)
PERIPHERAL SMEAR,PSM: NORMAL
PH UR STRIP: 7 [PH] (ref 5–9)
PLATELET # BLD AUTO: 309 K/UL (ref 150–450)
PMV BLD AUTO: 10.4 FL (ref 9.4–12.3)
POTASSIUM SERPL-SCNC: 4 MMOL/L (ref 3.5–5.1)
PROT SERPL-MCNC: 7.6 G/DL (ref 6.3–8.2)
PROT UR STRIP-MCNC: 30 MG/DL
RBC # BLD AUTO: 4.63 M/UL (ref 4.05–5.25)
RBC #/AREA URNS HPF: ABNORMAL /HPF
REFERENCE LAB,REFLB: NORMAL
SODIUM SERPL-SCNC: 137 MMOL/L (ref 136–145)
SP GR UR REFRACTOMETRY: 1.02 (ref 1–1.02)
T4 FREE SERPL-MCNC: 1.1 NG/DL (ref 0.78–1.46)
TEST DESCRIPTION:,ATST: NORMAL
TIBC SERPL-MCNC: 343 UG/DL (ref 250–450)
TSH SERPL DL<=0.005 MIU/L-ACNC: 3.69 UIU/ML (ref 0.36–3.74)
URATE SERPL-MCNC: 4.6 MG/DL (ref 2.6–6)
UROBILINOGEN UR QL STRIP.AUTO: 0.2 EU/DL (ref 0.2–1)
WBC # BLD AUTO: 9 K/UL (ref 4.3–11.1)
WBC URNS QL MICRO: ABNORMAL /HPF

## 2018-08-09 PROCEDURE — 80053 COMPREHEN METABOLIC PANEL: CPT

## 2018-08-09 PROCEDURE — 80074 ACUTE HEPATITIS PANEL: CPT

## 2018-08-09 PROCEDURE — 85025 COMPLETE CBC W/AUTO DIFF WBC: CPT

## 2018-08-09 PROCEDURE — 84550 ASSAY OF BLOOD/URIC ACID: CPT

## 2018-08-09 PROCEDURE — 84439 ASSAY OF FREE THYROXINE: CPT

## 2018-08-09 PROCEDURE — 86140 C-REACTIVE PROTEIN: CPT

## 2018-08-09 PROCEDURE — 81015 MICROSCOPIC EXAM OF URINE: CPT

## 2018-08-09 PROCEDURE — 82728 ASSAY OF FERRITIN: CPT

## 2018-08-09 PROCEDURE — 86038 ANTINUCLEAR ANTIBODIES: CPT

## 2018-08-09 PROCEDURE — 84443 ASSAY THYROID STIM HORMONE: CPT

## 2018-08-09 PROCEDURE — 86880 COOMBS TEST DIRECT: CPT

## 2018-08-09 PROCEDURE — 86430 RHEUMATOID FACTOR TEST QUAL: CPT

## 2018-08-09 PROCEDURE — 86162 COMPLEMENT TOTAL (CH50): CPT

## 2018-08-09 PROCEDURE — 85652 RBC SED RATE AUTOMATED: CPT

## 2018-08-09 PROCEDURE — 81003 URINALYSIS AUTO W/O SCOPE: CPT

## 2018-08-09 PROCEDURE — 82784 ASSAY IGA/IGD/IGG/IGM EACH: CPT

## 2018-08-09 PROCEDURE — 83615 LACTATE (LD) (LDH) ENZYME: CPT

## 2018-08-09 PROCEDURE — 36415 COLL VENOUS BLD VENIPUNCTURE: CPT

## 2018-08-09 PROCEDURE — 83550 IRON BINDING TEST: CPT

## 2018-08-09 PROCEDURE — 88185 FLOWCYTOMETRY/TC ADD-ON: CPT

## 2018-08-09 PROCEDURE — 82785 ASSAY OF IGE: CPT

## 2018-08-09 PROCEDURE — 87389 HIV-1 AG W/HIV-1&-2 AB AG IA: CPT

## 2018-08-09 PROCEDURE — 88184 FLOWCYTOMETRY/ TC 1 MARKER: CPT

## 2018-08-10 LAB
ANA SER QL: NEGATIVE
CH50 SERPL-ACNC: >60 U/ML
HAV IGM SERPL QL IA: NEGATIVE
HBV CORE IGM SERPL QL IA: NEGATIVE
HBV SURFACE AG SERPL QL IA: NEGATIVE
HCV AB S/CO SERPL IA: <0.1 S/CO RATIO (ref 0–0.9)
HIV 1+2 AB+HIV1 P24 AG SERPL QL IA: NON REACTIVE
RHEUMATOID FACT SER QL LA: NEGATIVE

## 2018-08-11 LAB
Lab: NORMAL
REFERENCE LAB,REFLB: NORMAL
TEST DESCRIPTION:,ATST: NORMAL

## 2018-08-12 LAB — IGE SERPL-ACNC: 170 IU/ML (ref 0–100)

## 2018-10-03 PROBLEM — E66.09 CLASS 1 OBESITY DUE TO EXCESS CALORIES WITH SERIOUS COMORBIDITY AND BODY MASS INDEX (BMI) OF 33.0 TO 33.9 IN ADULT: Status: ACTIVE | Noted: 2018-10-03

## 2018-10-03 PROBLEM — E78.00 HIGH CHOLESTEROL: Status: ACTIVE | Noted: 2018-10-03

## 2019-04-18 PROBLEM — R00.2 PALPITATIONS: Status: ACTIVE | Noted: 2019-04-18

## 2019-04-29 PROBLEM — Z92.89 HISTORY OF DIAGNOSTIC TESTS: Status: ACTIVE | Noted: 2019-04-29

## 2019-06-11 ENCOUNTER — DOCUMENTATION ONLY (OUTPATIENT)
Dept: NUTRITION | Age: 24
End: 2019-06-11

## 2019-06-11 NOTE — PROGRESS NOTES
Nutrition Counseling:  Pt referred by Dr. Ariela Joyce. Pt did not show for initial appt today.     Kailyn Alvarado MS, [address] [place] [address], [address], [place]  W: [phone]  C: [phone]

## 2019-07-02 ENCOUNTER — HOSPITAL ENCOUNTER (OUTPATIENT)
Dept: NUTRITION | Age: 24
End: 2019-07-02
Attending: FAMILY MEDICINE
Payer: COMMERCIAL

## 2019-07-02 ENCOUNTER — TELEPHONE (OUTPATIENT)
Dept: NUTRITION | Age: 24
End: 2019-07-02

## 2019-07-02 NOTE — TELEPHONE ENCOUNTER
Nutrition Counseling:  Pt referred by Dr. Chilango Aleman. Spoke with pt and advised that RD is sick and offered to r/s her appt.  New date set for 7/09/19 @ 3:00pm.    Ryan Archer, MS, RD, CSSD, LD  W: [phone]

## 2019-07-09 ENCOUNTER — HOSPITAL ENCOUNTER (OUTPATIENT)
Dept: NUTRITION | Age: 24
Discharge: HOME | End: 2019-07-09
Attending: FAMILY MEDICINE
Payer: COMMERCIAL

## 2019-07-09 PROCEDURE — 97802 MEDICAL NUTRITION INDIV IN: CPT

## 2019-07-09 NOTE — PROGRESS NOTES
NUTRITION ASSESSMENT    Pt referred by Dr Idris Mancia with diagnosis of Essential HTN, Class I Obesity d/t excess calories, and BMI 34.0-34.9. Medical history remarkable for HTN, high cholesterol, high triglycerides, ADHD, anxiety and depression. MD note indicates pt requested referral.Family medical history including granfather with DM type II and prediabetic father.  Previous Dieting Attempts/Current Knowledge: Voices  poor knowledge regarding balanced nutrition and appropriate portions for healthy weight. 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  Motivation: Pt states she requested the referral.Staes she has been steadily gaining weight since 2017 when she experienced a herniated disk. Notes it has limited her capacity for exercise. Is interested in changes she can make nutritionally.  Support: No support indentified at this appt.  Barriers: Food and nutriton-related knowledge deficit for healthful intake that will foster a healthy weight.  Stage of Change (Transtheoretical Model): Behaviors indicate current stage of change is: Preparation. Anticipate likely steps toward compliance with recommendations.       Anthropometrics:   Ht: 5'5\" (1.676m)  Wt:205lb (93.2kg) per pt  BMI: 34.2 (Obesity Class I) IBW for ht: 125 +/- 10%   Usual Weight:  140lb Highest Weight: 205llb      Wants to Weigh: 130lb       Weight History:  WT / BMI WEIGHT BODY MASS INDEX   5/7/2019 203 lb 3.2 oz 34.88 kg/m2   4/22/2019 202 lb 3.2 oz 34.71 kg/m2   3/18/2019 201 lb 34.5 kg/m2   2/4/2019 202 lb 3.2 oz 34.71 kg/m2   1/3/2019 196 lb 9.6 oz 33.75 kg/m2   10/3/2018 194 lb 33.3 kg/m2   4/30/2018 188 lb 6.4 oz 31.35 kg/m2   3/8/2018 190 lb 31.62 kg/m2   2/8/2018 201 lb 9.6 oz 33.55 kg/m2   1/3/2018 203 lb 33.78 kg/m2   10/16/2017 195 lb 32.45 kg/m2   5/19/2017 192 lb 31.95 kg/m2   2/27/2017 192 lb 31.95 kg/m2   2/10/2017 189 lb 31.45 kg/m2 12/5/2016 178 lb 29.62 kg/m2   6/27/2016 180 lb 6.4 oz 30.02 kg/m2   5/17/2016 179 lb 29.79 kg/m2       Nutrition Focused Physical Findings:   N/A    Nutritionally Relevant Medications:  Vyvanse  Levothyroxine  duloxetine  Trazone  amitriptyline    Supplements/Vitamins/Herbs:  none    Nutritionally Relevant Labs:  No recent labs available    Physical Activity:  None (d/t herniated disk)    Sleep: 6 hrs sleep/night    Food and Nutrient Intake:   Based on reported usual intake, pt consumes 2-3meals/day (sometimes skips lunch) with  snacks. Dines out daily for lunch and dinner. Sates her work has a cafeteria. Notes that she does not want to cook. Appears able to obtain appropriate nutritional choices as desired. Diet Recall:  Breakfast: 3 boiled eggs  Lunch: bag of chips  Dinner: spaghetti and chicken  Snacks: not noted. Beverages: 1.5 gallons water/day, 0oz soda/day, 0oz sweet tea/day, 8oz coffee @ 2 times/week. Diet Appears:   low in calcium containing choices   low in servings of fruits and vegetables   low in fiber   low in protein   low in added sugar   low in heart healthy fats    Food Allergies/Intolerances: None noted. Estimated Nutritional Needs to Maintain Current Wt:  EER: 2176 kcal/day (MSJ with activity factor 1.3 +/- 10% margin of error; Range (5018-1726 kcal/day)  EER for weight loss: 2000 - 500 = 1500 kcal/day  Carbohydrates: 150 gm/day (40% of kcal) or 45 gm/meal(3) with 15 gm/snack(1) (Approximately 450kcal/meal with 150 kcal/snack)  Protein: 94gm/day  (25% of kcal)  Fat: 58 gm/day (35% of kcal) Heart- healthy fats emphasized with lists given. Fluids: 1.5 L/day (1 ml/kcal)         NUTRITION DIAGNOSIS:   Excessive oral intake r/t food and nutrition-related knowledge deficit regarding appropriate food choices and eating patterns to maintain healthy weight as evidenced by pt's verbal recall that portions may be excessive as compared with recommendations and BMI 34.2.     NUTRITION INTERVENTION:  Appointment length: 75 minutes. Nutrition Education:  Purpose of nutrition education: To provide pt with education to increase knowledge for healthful po intake and to provide a meal plan/ meal template as an educational resource for reference in building healthy, balanced meals and snacks. Nutrition relationship in health/disease: Explained to pt the connection between food choices and health outcomes. Explained to pt the impact of over-nutrition and excessive intake of inflammatory foods/ processed foods in the disease process for chronic disease. Recommended modifications:   · Consume 3 balanced meals/day with a snack needed between meals >4 hrs apart. · Recommended 80/20 rule for food choices to increase flexibility and encourage life-long behaviior changes. The Plate Method:  · Educated pt on balancing intake of macronutrients at meals and snacks. Discussed the importance of adequate intake of all macronutrients for satiety and satisfaction. · Discussed proportions on plate: 1/2 non-starchy vegetables, 1/4 protein, 1/4 starch. Stressed the importance of healthy fat with every meal (lists given)  · Demonstrated appropriate portion sizes for various food groups and strategies for portion control. · Carbohydrates: Reviewed food groups containing CHO, provided food examples. Encouraged obtaining CHO from a variety of food groups to vary micronutrient intake, choosing less processed carbs with higher fiber. Encouraged decreasing intake of refined CHO foods/beverages with limited nutritional value and/or those high in added sugar. Reviewed list of starchy vegetables. · Protein: Reviewed food groups containing protein, provided food examples. Stressed the importance of adequate protein intake with each meal/snack to promote satiety following meals and maintenance of lean body mass. · Fat: Educated pt on importance of essential fats in diet. Advised of food sources.  Explained the need to increase sources of omega 3 fats and gave products to look for. Educated pt on trans fats and harm caused with consumption. Explained how to find trans fats in ingredient lists verses rather than nutrition fact label. MIND Diet:  MIND Diet Guidelines:  Mediterranean Diet/ DASH Diet combo to address blood pressure and any inflammation causes by food choices  · 6 servings green leafy vegetables /week  · 1 other non-starchy vegetable/day  · 5 servings nuts/week  · Berries 2 times/week  · Whole grains 3 times/day  · Beans for 3 or more meals/week  · Olive oil as primary oil  · Chicken and turkey for primary meat  · Fish 2-3 times/week    Skill development using Meal Plan:  · Provided pt with a meal plan and educated pt on how to use meal plan using hand references or  measuring cups to demonstrate appropriate portion sizes. Encouraged pt to use the meal plan as a teaching tool learn about food groups and appropriate portion sizes for present activity level. Advised using portions as a guide and to rely on kcal level if needed when dining out. · Explained to pt how to use the meal planner sheets with the meal plan. Pt composed 3 sample meal in office using meal planner. Nutrition Counseling: Motivational Interviewing:    Discussed pts intake and activity patterns as above. Reviewed 3 broad areas that impact weight: food choices, physical activity level, and energy balance. Advised that weight management should be viewed as a learning process and best accomplished by making small goals that pt can continue with for life. Essentially building health lifestyle that will facilitate weight loss and maintenance.  Advised pt on the qualities of individuals who are able to lose weight and maintain that wt loss. Highlighted that maintainers tend to continue in the dietary patterns and physical activity patterns they established in order to lose the weight.  Advised designing an approach with small goals that pt can practically plan on continuing for life (new behaviors). Encouraged a focus on healthful intake and to avoid a focus on weight numbers.  Engaged pt in a discussion of flexibility for food choices balanced with a focus on health. Recommended an 80/20 or 90/10  approach for healthful intake verses fun foods. Self-monitoring:   · Advised pt that goals of this program are to educate pt an appropriate intake and nutrition planning for health and weight management and to address barriers to action steps. The long term goal is to develop self- monitoring, self-regulating behaviors for life-long management skills. Advised pt that lifestyle changes will usually be required. · Encouraged pt to log foods while learning to balance meals to self-monitor intake compared to plans. Will review logs for pt at f/u. Goal Setting: Pt agreed to the following goals. Goal: Pt will eat 3 balanced meals/day with a planned snack. Plan: Pt will use MyPlate Template Meal Plan along with other materials given as guides and support to structure eating in timing, food choices, macronutrient amounts, and portion sizes for goal stated (as above). States she will reference template for the cafeteria and dining out. Materials Given:  MyPlate Template Meal Plan- 5607 kcal/day  Meal Planner Sheets  MyPlate Template   MIND Diet- guidelines  Goal Setting- pt selected action plan form   Fat Basics   Mindful Eating Verses Emotional Eating    MONITORING AND EVALUATION:    RECOMMENDATIONS FOR CONTINUED APPOINTMENTS/ SUPPORT:  · Pt was attentive and asked pertinent questions during appointment. It appears pt will take some action steps. · Pt would benefit from continued support with appts with dietitian until action steps are established for 6 months. · Frequency depends on any setbacks or need for support.   · Best practice guidelines indicate pts with most success follow up with RD:  regularly, every 2 weeks, especially when beginning new dietary changes (2013 AHA/ACC/TOS Guideline for the Management of Overweight and Obesity in Adults). Follow up appt: 7/23/19 @ 3:00pm  Follow-up Monitoring Plans: Will monitor any wt change if pt agrees to weight measurement. Will assess and address any barriers to or success with plans. Will review any food logs to compare with meal plan.     Alexa Enriquez MS, RD,CSSD, LD  W: [phone]

## 2019-08-05 ENCOUNTER — DOCUMENTATION ONLY (OUTPATIENT)
Dept: NUTRITION | Age: 24
End: 2019-08-05

## 2019-08-05 NOTE — PROGRESS NOTES
Nutrition Counseling:  Pt referred by Dr Ghazala Rainey . Pt did not show for follow-up appt today.     Babatunde Olmedo MS, [address] [place] [address], [address], [place]  W: [phone]  C: [phone]

## 2019-08-23 ENCOUNTER — TELEPHONE (OUTPATIENT)
Dept: NUTRITION | Age: 24
End: 2019-08-23

## 2019-08-23 NOTE — TELEPHONE ENCOUNTER
Nutrition Counseling:  Pt referred by Dr. Genny Winn. Made attempt to speak with pt about any f/u needs. Left VM to ask for a return call.     Ju Pineda MS, RD, CSSD, LD  W: [phone]

## 2019-09-12 ENCOUNTER — DOCUMENTATION ONLY (OUTPATIENT)
Dept: NUTRITION | Age: 24
End: 2019-09-12

## 2019-09-12 NOTE — PROGRESS NOTES
Nutrition Counseling:  Pt referred by Dr. Vj Knight. . Pt has not returned a call to reschedule her no-show follow-up appt. Will close referral on this end.     Mateusz Herrera MS, [address] [place] [address], [address], [place]  W: [phone]  C: [phone]

## 2019-10-17 ENCOUNTER — HOSPITAL ENCOUNTER (OUTPATIENT)
Dept: LAB | Age: 24
Discharge: HOME | End: 2019-10-17
Payer: COMMERCIAL

## 2019-10-17 DIAGNOSIS — I10 ESSENTIAL HYPERTENSION: ICD-10-CM

## 2019-10-17 PROCEDURE — 82384 ASSAY THREE CATECHOLAMINES: CPT

## 2019-10-19 LAB
COLLECT DURATION TIME UR: 24 HR
DOPAMINE 24H UR-MRATE: 210 UG/24 HR (ref 0–510)
DOPAMINE UR-MCNC: 280 UG/L
EPINEPH 24H UR-MRATE: 3 UG/24 HR (ref 0–20)
EPINEPH UR-MCNC: 4 UG/L
NOREPINEPH 24H UR-MRATE: 29 UG/24 HR (ref 0–135)
NOREPINEPH UR-MCNC: 38 UG/L
SPECIMEN VOL ?TM UR: 750 ML

## 2019-10-21 NOTE — PROGRESS NOTES
I reviewed results all in normal range.  continue current medications   urine for adrenalin output was normal

## 2019-11-20 RX ORDER — CLINDAMYCIN PHOSPHATE 900 MG/50ML
900 INJECTION INTRAVENOUS ONCE
Status: CANCELLED | OUTPATIENT
Start: 2019-11-20 | End: 2019-11-20

## 2019-12-09 ENCOUNTER — HOSPITAL ENCOUNTER (OUTPATIENT)
Dept: SURGERY | Age: 24
Discharge: HOME | End: 2019-12-09
Payer: COMMERCIAL

## 2019-12-09 VITALS
RESPIRATION RATE: 16 BRPM | DIASTOLIC BLOOD PRESSURE: 87 MMHG | OXYGEN SATURATION: 98 % | BODY MASS INDEX: 34.95 KG/M2 | TEMPERATURE: 98.2 F | SYSTOLIC BLOOD PRESSURE: 140 MMHG | HEIGHT: 65 IN

## 2019-12-09 LAB
BACTERIA SPEC CULT: NORMAL
SERVICE CMNT-IMP: NORMAL

## 2019-12-09 PROCEDURE — 77030027138 HC INCENT SPIROMETER -A

## 2019-12-09 PROCEDURE — 87641 MR-STAPH DNA AMP PROBE: CPT

## 2019-12-09 NOTE — PERIOP NOTES
PLEASE CONTINUE TAKING ALL PRESCRIPTION MEDICATIONS UP TO THE DAY OF SURGERY UNLESS OTHERWISE DIRECTED BELOW. DISCONTINUE all vitamins and supplements 7 days prior to surgery. Home Medications to take  the day of surgery   Levothyroxine and Amlodipine            Home Medications   to Hold   Vitamins, Supplements, and Herbals. Non-Steriodal Anti-Inflammatory (NSAIDS) such as Advil and Aleve. Meloxicam           Comments   Bring incentive spirometer to hospital on the day of surgery. Please do not bring home medications with you on the day of surgery unless otherwise directed by your nurse. If you are instructed to bring home medications, please give them to your nurse as they will be administered by the nursing staff. If you have any questions, please call [facility] [phone] or [facility] [phone].

## 2019-12-09 NOTE — PERIOP NOTES
Recent Results (from the past 12 hour(s))   MSSA/MRSA SC BY PCR, NASAL SWAB    Collection Time: 12/09/19  8:16 AM   Result Value Ref Range    Special Requests: NO SPECIAL REQUESTS      Culture result:        SA target not detected. A MRSA NEGATIVE, SA NEGATIVE test result does not preclude MRSA or SA nasal colonization. Results reviewed and no further action required.

## 2019-12-09 NOTE — PERIOP NOTES
Patient verified name & . Order to obtain consent found in EHR and matches case posting. TYPE  CASE:1B              Orders: received  Labs per Spine protocol:  MRSA/MSSA    Labs per anesthesia protocol: none    Medication list verbalized today. Instructed patient to continue previous medications as prescribed prior to surgery and  to [address] according to anesthesia guidelines with a small sip of water : Amlodipine and Levothyroxine       Continue all previous medications unless otherwise directed. Instructed patient to hold all vitamins and supplements (with exception of renal vitamins) and the following medications prior to surgery: Meloxicam    Pt viewed Spine Pre-hab Video. All further questions were addressed. Pt was provided with antibacterial or non-moisturizing soap, Hibiclens, long-handled sponge, Spine Recovery booklet and incentive spirometer. Pt correctly demonstrated use of incentive spirometer and instruction to bring it to the hospital on day of surgery. Handouts and all Surgery instructions provided to pt and pt verbalizes understanding. Patient Guide to Surgery Packet provided to patient. Packet includes Patient Guide to surgery handout, Facts about Pain Management handout, Facts about Urinary Catherization handout, Hand Hygiene handout, Patient Education and Teaching Sheet -Transfusion of Blood and Blood Products handout, and  [facility] frequent question and answer sheet. Guide reviewed with the patient and all questions answered to the satisfaction of the patient. Pt advised to visit www. [employer] for more educational information regarding anesthesia and to record any additional questions that arise so that it can be addressed by the anesthesiologist on the morning of surgery.     Patient instructed on the following and verbalized understanding:  Arrive at main entrance, time of arrival to be called the day before by 1700. Responsible adult must drive patient to and from hospital, stay during surgery and 24 hours postoperatively. Npo after midnight including gum, mints and ice chips. Shower using hibiclens the night before and the morning of surgery. Hibiclens provided to the patient with handout and verbal instructions for use. Leave all valuables at home. Instructed on no jewelry or body piercings on the dos. Bring insurance card and ID. No perfumes, oil, powder, colognes, makeup or  lotions on the skin. Patient verbalized understanding of all instructions and provided all medical/health information to the best of their ability.

## 2019-12-16 ENCOUNTER — ANESTHESIA EVENT (OUTPATIENT)
Dept: SURGERY | Age: 24
End: 2019-12-16
Payer: COMMERCIAL

## 2019-12-16 NOTE — H&P
Chief complaint: Back and leg pain. History of present illness: The patient returns today with continued symptoms of intractable back pain and radiation to the left leg. The onset of the symptoms was about 2 years ago when she had lifted a heavy box in her attic. She tried physical therapy, chiropractic care, massage, multiple injections, and pain management. Ultimately, she was found to have a large L5-S1 disc herniation resulting in left-sided lateral recess stenosis and impingement. At this point, her symptoms remain the same. I did send her for an updated MRI and she is here to review that study preoperatively. PMHx/PSHx/Social History/Medications/Allergies/ROS are documented separately and have been reviewed. ROS: Denies fever, chills, chest pain. ????? Medications: Amitriptyline HCl;Amphetamine ER;Lexapro; Loryna;Norco (5-325 MG, Take 1 tablet(s) by mouth every 6 hours as needed [PRN] pain); TraMADol HCl;Ultram (50 MG, Take 1 tablet(s) by mouth every 6 hours as needed [PRN])  ????? Allergies: Penicillin  ?????    Physical Exam: This is a well developed well nourished adult female in no acute distress. Mood and affect are appropriate. Oriented to person, place, and time. Head is normocephalic and atraumatic. Extraocular movements intact. Sclera anicteric. Respirations are unlabored and there is no evidence of cyanosis. Chest is clear to auscultation. Heart is regular rate and rhythm. Abdomen is soft. Straight leg testing is negative. There is subjective light touch sensory loss over the left calf and plantar foot.     Reflexes   Right Left   Quadriceps (L4) 2 2   Achilles (S1) 2 1     Ankle jerk is negative for clonus    Strength testing in the lower extremity reveals the following based on the 5 point grading scale:     HF (L2) H Ab (L5) KE (L3/4) ADF (L4) EHL (L5) A Ev (S1) APF (S1)   Right 5 5 5 5 5 5 5   Left 5 5 5 5 5 5 4     The feet are warm with good capillary refill and palpable pedal pulses. Radiographic Studies:    X-rays and MRI/CT were again independently reviewed and correlate with the patients symptoms. The MRI/CT shows L5-S1 disc herniation resulting in abutment of the bilateral descending S1 nerve roots. It actually does not look as severe as it had on the previous MRI. Maggy Ochoa Assessment/Plan: This patients clinical history and physical exam is consistent with left S1 radiculopathy and discogenic back pain. She's been tolerating it for the last couple of years and has exhausted all reasonable conservative efforts and hasnt had any long-term relief. She is very frustrated with her situation and would like to pursue surgical intervention. She understands that the updated MRI does not look quite as severe as the previous one. However, it is a supine MRI and I suspect the nerve roots are being impinged more in the standing position. She would like to go ahead and proceed. ????? The imaging stugy shows multifactorial lateral recess stenosis and a hemilaminectomy is recommended to decompress the neurologic structures involved. We discussed the details of the surgery including a midline incision in over the low back followed by dissection to the area of stenosis. The nerves would be freed up by trimming any impinging structures including disc, ligaments and bone. Once the nerves are freed the wound would be closed with suture and covered with sterile dressings. The patient would expect to stay in recovery for observation or overnight in the hospital depending on how quickly she recovers. Follow-up would be scheduled for 2-3 weeks and she would have restrictions including no driving, and no lifting greater than 15 lbs until follow up with me.   We also discussed the potential risks of the surgery including, but not limited to infection, spinal fluid leak and potential headaches requiring her to remain supine or have a lumbar drain inserted post-operatively; injury to the cauda equina or peripheral nerve root resulting in paralysis, bowel or bladder dysfunction, or loss of use of an extremity; persistent back or leg symptoms, recurrence of stenosis or the development of instability possibly needing additional surgery;  blood loss requiring transfusion; and the risks of anesthesia including, but not limited heart attack, stroke, and blood clot. The patient voiced an understanding of these issues and would like to discuss them over with her family and will get back with me with her desired treatment course. The surgery that I believe would be most beneficial here is a left L5-S1 hemilaminectomy. Koffi table with sling.           Electronically Signed By Scottie Huffman MD

## 2019-12-17 ENCOUNTER — APPOINTMENT (OUTPATIENT)
Dept: GENERAL RADIOLOGY | Age: 24
End: 2019-12-17
Attending: ORTHOPAEDIC SURGERY
Payer: COMMERCIAL

## 2019-12-17 ENCOUNTER — HOSPITAL ENCOUNTER (OUTPATIENT)
Age: 24
Setting detail: OUTPATIENT SURGERY
Discharge: HOME | End: 2019-12-17
Attending: ORTHOPAEDIC SURGERY | Admitting: ORTHOPAEDIC SURGERY
Payer: COMMERCIAL

## 2019-12-17 ENCOUNTER — ANESTHESIA (OUTPATIENT)
Dept: SURGERY | Age: 24
End: 2019-12-17
Payer: COMMERCIAL

## 2019-12-17 VITALS
OXYGEN SATURATION: 99 % | DIASTOLIC BLOOD PRESSURE: 61 MMHG | WEIGHT: 206.6 LBS | RESPIRATION RATE: 17 BRPM | BODY MASS INDEX: 34.42 KG/M2 | HEART RATE: 77 BPM | TEMPERATURE: 97.6 F | SYSTOLIC BLOOD PRESSURE: 120 MMHG | HEIGHT: 65 IN

## 2019-12-17 DIAGNOSIS — M48.062 LUMBAR STENOSIS WITH NEUROGENIC CLAUDICATION: Primary | ICD-10-CM

## 2019-12-17 LAB — HCG UR QL: NEGATIVE

## 2019-12-17 PROCEDURE — 74011000250 HC RX REV CODE- 250: Performed by: ORTHOPAEDIC SURGERY

## 2019-12-17 PROCEDURE — 81025 URINE PREGNANCY TEST: CPT

## 2019-12-17 PROCEDURE — 72020 X-RAY EXAM OF SPINE 1 VIEW: CPT

## 2019-12-17 PROCEDURE — 74011250636 HC RX REV CODE- 250/636: Performed by: NURSE ANESTHETIST, CERTIFIED REGISTERED

## 2019-12-17 PROCEDURE — 77030008703 HC TU ET UNCUF COVD -A: Performed by: ANESTHESIOLOGY

## 2019-12-17 PROCEDURE — 76210000006 HC OR PH I REC 0.5 TO 1 HR: Performed by: ORTHOPAEDIC SURGERY

## 2019-12-17 PROCEDURE — 74011250636 HC RX REV CODE- 250/636: Performed by: ORTHOPAEDIC SURGERY

## 2019-12-17 PROCEDURE — 77030018836 HC SOL IRR NACL ICUM -A: Performed by: ORTHOPAEDIC SURGERY

## 2019-12-17 PROCEDURE — 77030025623 HC BUR RND PRECIS STRY -D: Performed by: ORTHOPAEDIC SURGERY

## 2019-12-17 PROCEDURE — 76010000138 HC OR TIME 0.5 TO 1 HR: Performed by: ORTHOPAEDIC SURGERY

## 2019-12-17 PROCEDURE — 77030008477 HC STYL SATN SLP COVD -A: Performed by: ANESTHESIOLOGY

## 2019-12-17 PROCEDURE — 74011250636 HC RX REV CODE- 250/636: Performed by: ANESTHESIOLOGY

## 2019-12-17 PROCEDURE — 74011250637 HC RX REV CODE- 250/637: Performed by: ORTHOPAEDIC SURGERY

## 2019-12-17 PROCEDURE — 77030031139 HC SUT VCRL2 J&J -A: Performed by: ORTHOPAEDIC SURGERY

## 2019-12-17 PROCEDURE — 77030030163 HC BN WAX J&J -A: Performed by: ORTHOPAEDIC SURGERY

## 2019-12-17 PROCEDURE — 74011250637 HC RX REV CODE- 250/637: Performed by: ANESTHESIOLOGY

## 2019-12-17 PROCEDURE — 76210000026 HC REC RM PH II 1 TO 1.5 HR: Performed by: ORTHOPAEDIC SURGERY

## 2019-12-17 PROCEDURE — 76060000033 HC ANESTHESIA 1 TO 1.5 HR: Performed by: ORTHOPAEDIC SURGERY

## 2019-12-17 PROCEDURE — 77030012894: Performed by: ORTHOPAEDIC SURGERY

## 2019-12-17 PROCEDURE — 74011000250 HC RX REV CODE- 250: Performed by: NURSE ANESTHETIST, CERTIFIED REGISTERED

## 2019-12-17 PROCEDURE — 77030014650 HC SEAL MTRX FLOSEL BAXT -C: Performed by: ORTHOPAEDIC SURGERY

## 2019-12-17 PROCEDURE — 77030039425 HC BLD LARYNG TRULITE DISP TELE -A: Performed by: ANESTHESIOLOGY

## 2019-12-17 RX ORDER — NALOXONE HYDROCHLORIDE 0.4 MG/ML
0.1 INJECTION, SOLUTION INTRAMUSCULAR; INTRAVENOUS; SUBCUTANEOUS AS NEEDED
Status: DISCONTINUED | OUTPATIENT
Start: 2019-12-17 | End: 2019-12-17 | Stop reason: HOSPADM

## 2019-12-17 RX ORDER — MIDAZOLAM HYDROCHLORIDE 1 MG/ML
2 INJECTION, SOLUTION INTRAMUSCULAR; INTRAVENOUS
Status: DISCONTINUED | OUTPATIENT
Start: 2019-12-17 | End: 2019-12-17 | Stop reason: HOSPADM

## 2019-12-17 RX ORDER — LIDOCAINE HYDROCHLORIDE 20 MG/ML
INJECTION, SOLUTION EPIDURAL; INFILTRATION; INTRACAUDAL; PERINEURAL AS NEEDED
Status: DISCONTINUED | OUTPATIENT
Start: 2019-12-17 | End: 2019-12-17 | Stop reason: HOSPADM

## 2019-12-17 RX ORDER — GLYCOPYRROLATE 0.2 MG/ML
INJECTION INTRAMUSCULAR; INTRAVENOUS AS NEEDED
Status: DISCONTINUED | OUTPATIENT
Start: 2019-12-17 | End: 2019-12-17 | Stop reason: HOSPADM

## 2019-12-17 RX ORDER — CLINDAMYCIN PHOSPHATE 900 MG/50ML
900 INJECTION INTRAVENOUS ONCE
Status: COMPLETED | OUTPATIENT
Start: 2019-12-17 | End: 2019-12-17

## 2019-12-17 RX ORDER — MIDAZOLAM HYDROCHLORIDE 1 MG/ML
INJECTION, SOLUTION INTRAMUSCULAR; INTRAVENOUS AS NEEDED
Status: DISCONTINUED | OUTPATIENT
Start: 2019-12-17 | End: 2019-12-17 | Stop reason: HOSPADM

## 2019-12-17 RX ORDER — FENTANYL CITRATE 50 UG/ML
INJECTION, SOLUTION INTRAMUSCULAR; INTRAVENOUS AS NEEDED
Status: DISCONTINUED | OUTPATIENT
Start: 2019-12-17 | End: 2019-12-17 | Stop reason: HOSPADM

## 2019-12-17 RX ORDER — BUPIVACAINE HYDROCHLORIDE AND EPINEPHRINE 5; 5 MG/ML; UG/ML
INJECTION, SOLUTION EPIDURAL; INTRACAUDAL; PERINEURAL AS NEEDED
Status: DISCONTINUED | OUTPATIENT
Start: 2019-12-17 | End: 2019-12-17 | Stop reason: HOSPADM

## 2019-12-17 RX ORDER — ACETAMINOPHEN 10 MG/ML
1000 INJECTION, SOLUTION INTRAVENOUS ONCE
Status: COMPLETED | OUTPATIENT
Start: 2019-12-17 | End: 2019-12-17

## 2019-12-17 RX ORDER — NEOSTIGMINE METHYLSULFATE 1 MG/ML
INJECTION, SOLUTION INTRAVENOUS AS NEEDED
Status: DISCONTINUED | OUTPATIENT
Start: 2019-12-17 | End: 2019-12-17 | Stop reason: HOSPADM

## 2019-12-17 RX ORDER — ROCURONIUM BROMIDE 10 MG/ML
INJECTION, SOLUTION INTRAVENOUS AS NEEDED
Status: DISCONTINUED | OUTPATIENT
Start: 2019-12-17 | End: 2019-12-17 | Stop reason: HOSPADM

## 2019-12-17 RX ORDER — VANCOMYCIN HYDROCHLORIDE 1 G/20ML
INJECTION, POWDER, LYOPHILIZED, FOR SOLUTION INTRAVENOUS AS NEEDED
Status: DISCONTINUED | OUTPATIENT
Start: 2019-12-17 | End: 2019-12-17 | Stop reason: HOSPADM

## 2019-12-17 RX ORDER — DIPHENHYDRAMINE HYDROCHLORIDE 50 MG/ML
12.5 INJECTION, SOLUTION INTRAMUSCULAR; INTRAVENOUS
Status: DISCONTINUED | OUTPATIENT
Start: 2019-12-17 | End: 2019-12-17 | Stop reason: HOSPADM

## 2019-12-17 RX ORDER — DEXAMETHASONE SODIUM PHOSPHATE 4 MG/ML
INJECTION, SOLUTION INTRA-ARTICULAR; INTRALESIONAL; INTRAMUSCULAR; INTRAVENOUS; SOFT TISSUE AS NEEDED
Status: DISCONTINUED | OUTPATIENT
Start: 2019-12-17 | End: 2019-12-17 | Stop reason: HOSPADM

## 2019-12-17 RX ORDER — SODIUM CHLORIDE, SODIUM LACTATE, POTASSIUM CHLORIDE, CALCIUM CHLORIDE 600; 310; 30; 20 MG/100ML; MG/100ML; MG/100ML; MG/100ML
75 INJECTION, SOLUTION INTRAVENOUS CONTINUOUS
Status: DISCONTINUED | OUTPATIENT
Start: 2019-12-17 | End: 2019-12-17 | Stop reason: HOSPADM

## 2019-12-17 RX ORDER — LIDOCAINE HYDROCHLORIDE 10 MG/ML
0.1 INJECTION INFILTRATION; PERINEURAL AS NEEDED
Status: DISCONTINUED | OUTPATIENT
Start: 2019-12-17 | End: 2019-12-17 | Stop reason: HOSPADM

## 2019-12-17 RX ORDER — FLUMAZENIL 0.1 MG/ML
0.2 INJECTION INTRAVENOUS
Status: DISCONTINUED | OUTPATIENT
Start: 2019-12-17 | End: 2019-12-17 | Stop reason: HOSPADM

## 2019-12-17 RX ORDER — SODIUM CHLORIDE, SODIUM LACTATE, POTASSIUM CHLORIDE, CALCIUM CHLORIDE 600; 310; 30; 20 MG/100ML; MG/100ML; MG/100ML; MG/100ML
100 INJECTION, SOLUTION INTRAVENOUS CONTINUOUS
Status: DISCONTINUED | OUTPATIENT
Start: 2019-12-17 | End: 2019-12-17 | Stop reason: HOSPADM

## 2019-12-17 RX ORDER — ONDANSETRON 2 MG/ML
INJECTION INTRAMUSCULAR; INTRAVENOUS AS NEEDED
Status: DISCONTINUED | OUTPATIENT
Start: 2019-12-17 | End: 2019-12-17 | Stop reason: HOSPADM

## 2019-12-17 RX ORDER — OXYCODONE HYDROCHLORIDE 5 MG/1
5 TABLET ORAL
Status: COMPLETED | OUTPATIENT
Start: 2019-12-17 | End: 2019-12-17

## 2019-12-17 RX ORDER — ESMOLOL HYDROCHLORIDE 10 MG/ML
INJECTION INTRAVENOUS AS NEEDED
Status: DISCONTINUED | OUTPATIENT
Start: 2019-12-17 | End: 2019-12-17 | Stop reason: HOSPADM

## 2019-12-17 RX ORDER — OXYCODONE HYDROCHLORIDE 5 MG/1
5 TABLET ORAL
Qty: 30 TAB | Refills: 0 | Status: SHIPPED | OUTPATIENT
Start: 2019-12-17 | End: 2019-12-22

## 2019-12-17 RX ORDER — PROPOFOL 10 MG/ML
INJECTION, EMULSION INTRAVENOUS AS NEEDED
Status: DISCONTINUED | OUTPATIENT
Start: 2019-12-17 | End: 2019-12-17 | Stop reason: HOSPADM

## 2019-12-17 RX ORDER — HYDROMORPHONE HYDROCHLORIDE 2 MG/ML
0.5 INJECTION, SOLUTION INTRAMUSCULAR; INTRAVENOUS; SUBCUTANEOUS
Status: COMPLETED | OUTPATIENT
Start: 2019-12-17 | End: 2019-12-17

## 2019-12-17 RX ADMIN — ROCURONIUM BROMIDE 30 MG: 10 INJECTION, SOLUTION INTRAVENOUS at 08:43

## 2019-12-17 RX ADMIN — PROPOFOL 200 MG: 10 INJECTION, EMULSION INTRAVENOUS at 08:42

## 2019-12-17 RX ADMIN — ONDANSETRON 4 MG: 2 INJECTION INTRAMUSCULAR; INTRAVENOUS at 08:52

## 2019-12-17 RX ADMIN — HYDROMORPHONE HYDROCHLORIDE 0.5 MG: 2 INJECTION INTRAMUSCULAR; INTRAVENOUS; SUBCUTANEOUS at 10:21

## 2019-12-17 RX ADMIN — CLINDAMYCIN PHOSPHATE 900 MG: 900 INJECTION, SOLUTION INTRAVENOUS at 08:52

## 2019-12-17 RX ADMIN — OXYCODONE HYDROCHLORIDE 5 MG: 5 TABLET ORAL at 09:54

## 2019-12-17 RX ADMIN — MIDAZOLAM HYDROCHLORIDE 2 MG: 2 INJECTION, SOLUTION INTRAMUSCULAR; INTRAVENOUS at 08:40

## 2019-12-17 RX ADMIN — HYDROMORPHONE HYDROCHLORIDE 0.5 MG: 2 INJECTION INTRAMUSCULAR; INTRAVENOUS; SUBCUTANEOUS at 09:54

## 2019-12-17 RX ADMIN — LIDOCAINE HYDROCHLORIDE 100 MG: 20 INJECTION, SOLUTION EPIDURAL; INFILTRATION; INTRACAUDAL; PERINEURAL at 08:42

## 2019-12-17 RX ADMIN — HYDROMORPHONE HYDROCHLORIDE 0.5 MG: 2 INJECTION INTRAMUSCULAR; INTRAVENOUS; SUBCUTANEOUS at 10:14

## 2019-12-17 RX ADMIN — PHENYLEPHRINE HYDROCHLORIDE 100 MCG: 10 INJECTION INTRAVENOUS at 09:18

## 2019-12-17 RX ADMIN — FENTANYL CITRATE 100 MCG: 50 INJECTION INTRAMUSCULAR; INTRAVENOUS at 08:42

## 2019-12-17 RX ADMIN — Medication 3 AMPULE: at 07:35

## 2019-12-17 RX ADMIN — GLYCOPYRROLATE 0.8 MG: 0.2 INJECTION, SOLUTION INTRAMUSCULAR; INTRAVENOUS at 09:18

## 2019-12-17 RX ADMIN — ESMOLOL HYDROCHLORIDE 30 MG: 10 INJECTION, SOLUTION INTRAVENOUS at 09:21

## 2019-12-17 RX ADMIN — FENTANYL CITRATE 50 MCG: 50 INJECTION INTRAMUSCULAR; INTRAVENOUS at 09:00

## 2019-12-17 RX ADMIN — SODIUM CHLORIDE, SODIUM LACTATE, POTASSIUM CHLORIDE, AND CALCIUM CHLORIDE 100 ML/HR: 600; 310; 30; 20 INJECTION, SOLUTION INTRAVENOUS at 07:34

## 2019-12-17 RX ADMIN — HYDROMORPHONE HYDROCHLORIDE 0.5 MG: 2 INJECTION INTRAMUSCULAR; INTRAVENOUS; SUBCUTANEOUS at 10:04

## 2019-12-17 RX ADMIN — PHENYLEPHRINE HYDROCHLORIDE 100 MCG: 10 INJECTION INTRAVENOUS at 09:12

## 2019-12-17 RX ADMIN — DEXAMETHASONE SODIUM PHOSPHATE 4 MG: 4 INJECTION, SOLUTION INTRAMUSCULAR; INTRAVENOUS at 08:52

## 2019-12-17 RX ADMIN — ESMOLOL HYDROCHLORIDE 30 MG: 10 INJECTION, SOLUTION INTRAVENOUS at 09:04

## 2019-12-17 RX ADMIN — Medication 5 MG: at 09:18

## 2019-12-17 RX ADMIN — ACETAMINOPHEN 1000 MG: 10 INJECTION, SOLUTION INTRAVENOUS at 10:22

## 2019-12-17 NOTE — ANESTHESIA POSTPROCEDURE EVALUATION
Procedure(s):  LEFT L5 S1 HEMILAMINECTOMY WITH DISCECTOMY. general    Anesthesia Post Evaluation      Multimodal analgesia: multimodal analgesia used between 6 hours prior to anesthesia start to PACU discharge  Patient location during evaluation: PACU  Patient participation: complete - patient participated  Level of consciousness: awake and alert  Pain management: adequate  Airway patency: patent  Anesthetic complications: no  Cardiovascular status: acceptable  Respiratory status: acceptable  Hydration status: acceptable  Post anesthesia nausea and vomiting:  controlled      Vitals Value Taken Time   /71 12/17/2019 10:08 AM   Temp 36.2 °C (97.2 °F) 12/17/2019  9:39 AM   Pulse 100 12/17/2019 10:11 AM   Resp 16 12/17/2019 10:08 AM   SpO2 99 % 12/17/2019 10:11 AM   Vitals shown include unvalidated device data.

## 2019-12-17 NOTE — ANESTHESIA PREPROCEDURE EVALUATION
Relevant Problems   No relevant active problems       Anesthetic History   No history of anesthetic complications            Review of Systems / Medical History  Patient summary reviewed and pertinent labs reviewed    Pulmonary  Within defined limits                 Neuro/Psych   Within defined limits           Cardiovascular    Hypertension: well controlled              Exercise tolerance: >4 METS     GI/Hepatic/Renal     GERD: well controlled           Endo/Other      Hypothyroidism: well controlled  Obesity     Other Findings   Comments: Lumbar stenosis and LBP           Physical Exam    Airway  Mallampati: III  TM Distance: > 6 cm  Neck ROM: normal range of motion   Mouth opening: Normal     Cardiovascular  Regular rate and rhythm,  S1 and S2 normal,  no murmur, click, rub, or gallop             Dental  No notable dental hx       Pulmonary  Breath sounds clear to auscultation               Abdominal         Other Findings            Anesthetic Plan    ASA: 2  Anesthesia type: general  ETT        Induction: Intravenous  Anesthetic plan and risks discussed with: Patient

## 2019-12-17 NOTE — PERIOP NOTES
Verbal order from Anaya Melendrez MD for Ofirmev 1000mg for patient at this time, as this nurse has given 1.5mg of Dilaudid with no relief.

## 2019-12-17 NOTE — OP NOTES
[address] [address]   [phone]    OPERATIVE REPORT    Patient Ute Gonzalez  [id number]  1995  30 y.o. DATE OF SURGERY: 12/17/2019    SURGEON: Dr. Faith Skinner DIAGNOSIS:left  L5 - S1 lateral recess stenosis and disc herniation. POSTOPERATIVE DIAGNOSIS: left L5 - S1 lateral recess stenosis and disc herniation. PROCEDURE:     1. left L5 and S1 hemilaminectomy including discectomy. (CPT [id number], 85954 X 1)      ANESTHESIA: General.    ESTIMATED BLOOD LOSS: 100 CC    POSTOPERATIVE CONDITION: Stable. INTRAOPERATIVE COMPLICATIONS: None. INDICATION FOR PROCEDURE: The patient has a history of low back pain with episodic radiation to the right lower extremity consistent with neurogenic claudication primarily affecting the S1 nerve root. The patient has failed an extended period of observation and conservative measures. In the outpatient setting, the risks, benefits, and potential complications of the procedure were discussed and an informed consent was obtained. DESCRIPTION OF PROCEDURE: After adequate induction of general anesthesia, the patient was positioned prone on the [facility] spinal table. Care was taken to pad all bony prominences. Shoulders and elbows were placed in a 90-90 position. The abdomen was allowed to hang free to decrease intraabdominal pressure. The legs were flexed using the sling. The lumbar area was prepped and draped in the usual sterile fashion using a DuraPrep scrub. Preoperative antibiotics were administered. A time-out was called to confirm appropriate patient, proposed procedure, and proposed incision site. With this confirmation, an incision was created over the appropriate interspace. Dissection was carried down to the lumbodorsal fascia. The lumbodorsal fascia was released on the left side and dissection was carried down to the lamina and pars interarticularis.  A 000 curette was used to elevate the ligamentum flavum at its origin on the caudal surface of the L5  lamina and a Penfield number four was slipped just anterior to the lamina. C-arm fluoroscopy was brought in and used to obtain a cross table fluoroscopic image to confirm appropriate spinal level. With this confirmation, the Travon Aquas number four was removed and the area was marked with electrocautery. The operating microscope was brought to the surgical field. The ligamentum flavum was then elevated off of its insertion on the cephalad surface of the  S1  lamina. A 4 mm Kerrison was used to perform a hemilaminectomy of L5 as well as the cephalad portion of S1. The ligamentum flavum was carefully elevated off of the thecal sac and excised with the Kerrison rongeur. The descending nerve root was identified and retracted medially. The lateral recess was undercut laterally to the pedicle. A foraminotomy was performed to decompress the exiting nerve root. The nerve root was retracted medially again with the Salome nerve root retractor. The underlying disk was identified and inspected. A disc herniation was noted and a discectomy was also performed. The nerve was released from retraction and the area inspected and palpated with a nerve hook for adequacy of decompression. This was satisfactory. The lateral rescess was flushed with saline solution. The dorsal wound was flushed as well. The lumbodorsal fascia was approximated with a number one Vicryl suture in interrupted fashion. The skin and subcutaneous tissue were then closed in a layered fashion. Marcaine was infiltrated subcutaneously. Benzoin and Steri-Strips were applied. Sterile dressings were applied. The patient tolerated the procedure well and was returned to the postanesthesia care unit in stable condition. At the end of the case, all sponge, needle, and instrument counts were correct. Signed: Oscar Crocker.  Juan Ledesma MD

## 2020-02-19 ENCOUNTER — HOSPITAL ENCOUNTER (OUTPATIENT)
Dept: MRI IMAGING | Age: 25
Discharge: HOME | End: 2020-02-19
Attending: ORTHOPAEDIC SURGERY
Payer: COMMERCIAL

## 2020-02-19 DIAGNOSIS — M54.10 BACK PAIN WITH LEFT-SIDED RADICULOPATHY: ICD-10-CM

## 2020-02-19 PROCEDURE — 74011250636 HC RX REV CODE- 250/636: Performed by: ORTHOPAEDIC SURGERY

## 2020-02-19 PROCEDURE — 72158 MRI LUMBAR SPINE W/O & W/DYE: CPT

## 2020-02-19 PROCEDURE — A9575 INJ GADOTERATE MEGLUMI 0.1ML: HCPCS | Performed by: ORTHOPAEDIC SURGERY

## 2020-02-19 RX ORDER — SODIUM CHLORIDE 0.9 % (FLUSH) 0.9 %
10 SYRINGE (ML) INJECTION
Status: COMPLETED | OUTPATIENT
Start: 2020-02-19 | End: 2020-02-19

## 2020-02-19 RX ORDER — GADOTERATE MEGLUMINE 376.9 MG/ML
18 INJECTION INTRAVENOUS
Status: COMPLETED | OUTPATIENT
Start: 2020-02-19 | End: 2020-02-19

## 2020-02-19 RX ADMIN — Medication 10 ML: at 17:05

## 2020-02-19 RX ADMIN — GADOTERATE MEGLUMINE 18 ML: 376.9 INJECTION INTRAVENOUS at 17:05

## 2020-09-23 RX ORDER — CLINDAMYCIN PHOSPHATE 900 MG/50ML
900 INJECTION INTRAVENOUS ONCE
Status: CANCELLED | OUTPATIENT
Start: 2020-09-23 | End: 2020-09-23

## 2020-12-07 ENCOUNTER — HOSPITAL ENCOUNTER (OUTPATIENT)
Dept: SURGERY | Age: 25
Discharge: HOME | End: 2020-12-07
Payer: COMMERCIAL

## 2020-12-07 VITALS
DIASTOLIC BLOOD PRESSURE: 72 MMHG | TEMPERATURE: 98 F | HEIGHT: 65 IN | WEIGHT: 180.7 LBS | BODY MASS INDEX: 30.1 KG/M2 | OXYGEN SATURATION: 98 % | HEART RATE: 110 BPM | SYSTOLIC BLOOD PRESSURE: 134 MMHG | RESPIRATION RATE: 18 BRPM

## 2020-12-07 LAB
BACTERIA SPEC CULT: NORMAL
CREAT SERPL-MCNC: 0.92 MG/DL (ref 0.6–1)
HGB BLD-MCNC: 14.3 G/DL (ref 11.7–15.4)
POTASSIUM SERPL-SCNC: 3.9 MMOL/L (ref 3.5–5.1)
SERVICE CMNT-IMP: NORMAL

## 2020-12-07 PROCEDURE — 87641 MR-STAPH DNA AMP PROBE: CPT

## 2020-12-07 PROCEDURE — 85018 HEMOGLOBIN: CPT

## 2020-12-07 PROCEDURE — 82565 ASSAY OF CREATININE: CPT

## 2020-12-07 PROCEDURE — 84132 ASSAY OF SERUM POTASSIUM: CPT

## 2020-12-07 RX ORDER — TRAMADOL HYDROCHLORIDE 300 MG/1
TABLET, FILM COATED, EXTENDED RELEASE ORAL EVERY EVENING
COMMUNITY
End: 2020-12-21 | Stop reason: SDUPTHER

## 2020-12-07 NOTE — PERIOP NOTES
PLEASE CONTINUE TAKING ALL PRESCRIPTION MEDICATIONS UP TO THE DAY OF SURGERY UNLESS OTHERWISE DIRECTED BELOW. DISCONTINUE all vitamins and supplements 7 days prior to surgery. DISCONTINUE Non-Steriodal Anti-Inflammatory (NSAIDS) such as Advil and Aleve 5 days prior to surgery. Home Medications to take  the day of surgery   Amlodipine (Norvasc)  Levothyroxine (Synthroid)            Home Medications   to Hold   Hydrochlorothiazide (HCTZ) hold am of surgery only          Comments          *Visitor policy of 1 visitor per patient discussed. Please do not bring home medications with you on the day of surgery unless otherwise directed by your nurse. If you are instructed to bring home medications, please give them to your nurse as they will be administered by the nursing staff. If you have any questions, please call [address] [place] [address] [phone] or [address] [facility] [phone]. A copy of this note was provided to the patient for reference.

## 2020-12-07 NOTE — PERIOP NOTES
Patient verified name, , and surgery as listed in [facility]. Patient provided medical/health information and PTA medications to the best of their ability. TYPE  CASE: II  Orders per surgeon: were received  Labs per surgeon: MRSA  Labs per anesthesia protocol: hgb, K+  EKG:  Not required    Patient aware that a negative Covid swab result is required to proceed with surgery;  appointments are made by the surgeon office and test should be collected 7 days prior to surgery. The testing center is located at the . [address], [place]. Nasal Swab collected per MD order. Patient provided with and instructed on education handouts including Guide to Surgery, blood transfusions, pain management, and hand hygiene for the family and community, and [facility] brochure. Road to Recovery Spine surgery patient guide given. Instructed on incentive spirometry with return demonstration. Patient viewed spine prehab video. Hibiclens and instructions given per hospital policy. Original medication prescription bottles not visualized during patient appointment. Patient teach back successful and patient demonstrates knowledge of instruction.

## 2020-12-07 NOTE — PERIOP NOTES
Recent Results (from the past 12 hour(s))   MSSA/MRSA SC BY PCR, NASAL SWAB    Collection Time: 12/07/20  8:02 AM    Specimen: Swab   Result Value Ref Range    Special Requests: NO SPECIAL REQUESTS      Culture result:        SA target not detected. A MRSA NEGATIVE, SA NEGATIVE test result does not preclude MRSA or SA nasal colonization.    HEMOGLOBIN    Collection Time: 12/07/20  8:02 AM   Result Value Ref Range    HGB 14.3 11.7 - 15.4 g/dL   POTASSIUM    Collection Time: 12/07/20  8:02 AM   Result Value Ref Range    Potassium 3.9 3.5 - 5.1 mmol/L   CREATININE    Collection Time: 12/07/20  8:02 AM   Result Value Ref Range    Creatinine 0.92 0.6 - 1.0 MG/DL

## 2020-12-14 ENCOUNTER — ANESTHESIA EVENT (OUTPATIENT)
Dept: SURGERY | Age: 25
End: 2020-12-14
Payer: COMMERCIAL

## 2020-12-15 ENCOUNTER — APPOINTMENT (OUTPATIENT)
Dept: GENERAL RADIOLOGY | Age: 25
End: 2020-12-15
Attending: ORTHOPAEDIC SURGERY
Payer: COMMERCIAL

## 2020-12-15 ENCOUNTER — ANESTHESIA (OUTPATIENT)
Dept: SURGERY | Age: 25
End: 2020-12-15
Payer: COMMERCIAL

## 2020-12-15 ENCOUNTER — HOSPITAL ENCOUNTER (OUTPATIENT)
Age: 25
Setting detail: OUTPATIENT SURGERY
Discharge: HOME | End: 2020-12-15
Attending: ORTHOPAEDIC SURGERY | Admitting: ORTHOPAEDIC SURGERY
Payer: COMMERCIAL

## 2020-12-15 VITALS
TEMPERATURE: 98.1 F | DIASTOLIC BLOOD PRESSURE: 72 MMHG | RESPIRATION RATE: 14 BRPM | SYSTOLIC BLOOD PRESSURE: 130 MMHG | OXYGEN SATURATION: 95 % | HEART RATE: 103 BPM | WEIGHT: 181.4 LBS | HEIGHT: 65 IN | BODY MASS INDEX: 30.22 KG/M2

## 2020-12-15 DIAGNOSIS — M48.062 LUMBAR STENOSIS WITH NEUROGENIC CLAUDICATION: Primary | ICD-10-CM

## 2020-12-15 PROBLEM — M51.26 LUMBAR DISC HERNIATION: Status: ACTIVE | Noted: 2018-05-21

## 2020-12-15 LAB — HCG UR QL: NEGATIVE

## 2020-12-15 PROCEDURE — 81025 URINE PREGNANCY TEST: CPT

## 2020-12-15 PROCEDURE — 77030018673: Performed by: ORTHOPAEDIC SURGERY

## 2020-12-15 PROCEDURE — 77030025623 HC BUR RND PRECIS STRY -D: Performed by: ORTHOPAEDIC SURGERY

## 2020-12-15 PROCEDURE — 77030012894: Performed by: ORTHOPAEDIC SURGERY

## 2020-12-15 PROCEDURE — 77030028270 HC SRGFL HEMSTAT MTRX J&J -C: Performed by: ORTHOPAEDIC SURGERY

## 2020-12-15 PROCEDURE — 74011000250 HC RX REV CODE- 250: Performed by: NURSE ANESTHETIST, CERTIFIED REGISTERED

## 2020-12-15 PROCEDURE — 77030019908 HC STETH ESOPH SIMS -A: Performed by: ANESTHESIOLOGY

## 2020-12-15 PROCEDURE — 2709999900 HC NON-CHARGEABLE SUPPLY: Performed by: ORTHOPAEDIC SURGERY

## 2020-12-15 PROCEDURE — 74011000250 HC RX REV CODE- 250: Performed by: ORTHOPAEDIC SURGERY

## 2020-12-15 PROCEDURE — 74011250636 HC RX REV CODE- 250/636: Performed by: NURSE ANESTHETIST, CERTIFIED REGISTERED

## 2020-12-15 PROCEDURE — 77030029099 HC BN WAX SSPC -A: Performed by: ORTHOPAEDIC SURGERY

## 2020-12-15 PROCEDURE — 74011250636 HC RX REV CODE- 250/636: Performed by: ORTHOPAEDIC SURGERY

## 2020-12-15 PROCEDURE — 77030039425 HC BLD LARYNG TRULITE DISP TELE -A: Performed by: ANESTHESIOLOGY

## 2020-12-15 PROCEDURE — 77030031139 HC SUT VCRL2 J&J -A: Performed by: ORTHOPAEDIC SURGERY

## 2020-12-15 PROCEDURE — 72020 X-RAY EXAM OF SPINE 1 VIEW: CPT

## 2020-12-15 PROCEDURE — 74011250636 HC RX REV CODE- 250/636

## 2020-12-15 PROCEDURE — 77030037088 HC TUBE ENDOTRACH ORAL NSL COVD-A: Performed by: ANESTHESIOLOGY

## 2020-12-15 PROCEDURE — 74011250636 HC RX REV CODE- 250/636: Performed by: ANESTHESIOLOGY

## 2020-12-15 PROCEDURE — 77030040922 HC BLNKT HYPOTHRM STRY -A: Performed by: ANESTHESIOLOGY

## 2020-12-15 PROCEDURE — 76010000161 HC OR TIME 1 TO 1.5 HR INTENSV-TIER 1: Performed by: ORTHOPAEDIC SURGERY

## 2020-12-15 PROCEDURE — 76210000021 HC REC RM PH II 0.5 TO 1 HR: Performed by: ORTHOPAEDIC SURGERY

## 2020-12-15 PROCEDURE — 77030040361 HC SLV COMPR DVT MDII -B: Performed by: ORTHOPAEDIC SURGERY

## 2020-12-15 PROCEDURE — 76060000033 HC ANESTHESIA 1 TO 1.5 HR: Performed by: ORTHOPAEDIC SURGERY

## 2020-12-15 PROCEDURE — 74011250637 HC RX REV CODE- 250/637

## 2020-12-15 PROCEDURE — 76210000000 HC OR PH I REC 2 TO 2.5 HR: Performed by: ORTHOPAEDIC SURGERY

## 2020-12-15 PROCEDURE — 74011250637 HC RX REV CODE- 250/637: Performed by: ANESTHESIOLOGY

## 2020-12-15 RX ORDER — FENTANYL CITRATE 50 UG/ML
100 INJECTION, SOLUTION INTRAMUSCULAR; INTRAVENOUS ONCE
Status: DISCONTINUED | OUTPATIENT
Start: 2020-12-15 | End: 2020-12-15 | Stop reason: HOSPADM

## 2020-12-15 RX ORDER — HYDROMORPHONE HYDROCHLORIDE 2 MG/ML
0.5 INJECTION, SOLUTION INTRAMUSCULAR; INTRAVENOUS; SUBCUTANEOUS
Status: DISCONTINUED | OUTPATIENT
Start: 2020-12-15 | End: 2020-12-15 | Stop reason: HOSPADM

## 2020-12-15 RX ORDER — ESMOLOL HYDROCHLORIDE 10 MG/ML
INJECTION INTRAVENOUS AS NEEDED
Status: DISCONTINUED | OUTPATIENT
Start: 2020-12-15 | End: 2020-12-15 | Stop reason: HOSPADM

## 2020-12-15 RX ORDER — VANCOMYCIN HYDROCHLORIDE 1 G/20ML
INJECTION, POWDER, LYOPHILIZED, FOR SOLUTION INTRAVENOUS AS NEEDED
Status: DISCONTINUED | OUTPATIENT
Start: 2020-12-15 | End: 2020-12-15 | Stop reason: HOSPADM

## 2020-12-15 RX ORDER — MIDAZOLAM HYDROCHLORIDE 1 MG/ML
2 INJECTION, SOLUTION INTRAMUSCULAR; INTRAVENOUS ONCE
Status: COMPLETED | OUTPATIENT
Start: 2020-12-15 | End: 2020-12-15

## 2020-12-15 RX ORDER — SODIUM CHLORIDE, SODIUM LACTATE, POTASSIUM CHLORIDE, CALCIUM CHLORIDE 600; 310; 30; 20 MG/100ML; MG/100ML; MG/100ML; MG/100ML
100 INJECTION, SOLUTION INTRAVENOUS CONTINUOUS
Status: DISCONTINUED | OUTPATIENT
Start: 2020-12-15 | End: 2020-12-15 | Stop reason: HOSPADM

## 2020-12-15 RX ORDER — FENTANYL CITRATE 50 UG/ML
INJECTION, SOLUTION INTRAMUSCULAR; INTRAVENOUS AS NEEDED
Status: DISCONTINUED | OUTPATIENT
Start: 2020-12-15 | End: 2020-12-15 | Stop reason: HOSPADM

## 2020-12-15 RX ORDER — OXYCODONE HYDROCHLORIDE 5 MG/1
5 TABLET ORAL
Status: DISCONTINUED | OUTPATIENT
Start: 2020-12-15 | End: 2020-12-15 | Stop reason: HOSPADM

## 2020-12-15 RX ORDER — LIDOCAINE HYDROCHLORIDE 20 MG/ML
INJECTION, SOLUTION EPIDURAL; INFILTRATION; INTRACAUDAL; PERINEURAL AS NEEDED
Status: DISCONTINUED | OUTPATIENT
Start: 2020-12-15 | End: 2020-12-15 | Stop reason: HOSPADM

## 2020-12-15 RX ORDER — NEOSTIGMINE METHYLSULFATE 1 MG/ML
INJECTION, SOLUTION INTRAVENOUS AS NEEDED
Status: DISCONTINUED | OUTPATIENT
Start: 2020-12-15 | End: 2020-12-15 | Stop reason: HOSPADM

## 2020-12-15 RX ORDER — DEXAMETHASONE SODIUM PHOSPHATE 4 MG/ML
INJECTION, SOLUTION INTRA-ARTICULAR; INTRALESIONAL; INTRAMUSCULAR; INTRAVENOUS; SOFT TISSUE AS NEEDED
Status: DISCONTINUED | OUTPATIENT
Start: 2020-12-15 | End: 2020-12-15 | Stop reason: HOSPADM

## 2020-12-15 RX ORDER — ONDANSETRON 2 MG/ML
INJECTION INTRAMUSCULAR; INTRAVENOUS AS NEEDED
Status: DISCONTINUED | OUTPATIENT
Start: 2020-12-15 | End: 2020-12-15 | Stop reason: HOSPADM

## 2020-12-15 RX ORDER — CLINDAMYCIN PHOSPHATE 900 MG/50ML
900 INJECTION INTRAVENOUS ONCE
Status: COMPLETED | OUTPATIENT
Start: 2020-12-15 | End: 2020-12-15

## 2020-12-15 RX ORDER — GLYCOPYRROLATE 0.2 MG/ML
INJECTION INTRAMUSCULAR; INTRAVENOUS AS NEEDED
Status: DISCONTINUED | OUTPATIENT
Start: 2020-12-15 | End: 2020-12-15 | Stop reason: HOSPADM

## 2020-12-15 RX ORDER — BUPIVACAINE HYDROCHLORIDE 5 MG/ML
INJECTION, SOLUTION EPIDURAL; INTRACAUDAL AS NEEDED
Status: DISCONTINUED | OUTPATIENT
Start: 2020-12-15 | End: 2020-12-15 | Stop reason: HOSPADM

## 2020-12-15 RX ORDER — OXYCODONE HYDROCHLORIDE 5 MG/1
10 TABLET ORAL
Status: COMPLETED | OUTPATIENT
Start: 2020-12-15 | End: 2020-12-15

## 2020-12-15 RX ORDER — ONDANSETRON 2 MG/ML
4 INJECTION INTRAMUSCULAR; INTRAVENOUS ONCE
Status: DISCONTINUED | OUTPATIENT
Start: 2020-12-15 | End: 2020-12-15 | Stop reason: HOSPADM

## 2020-12-15 RX ORDER — NALOXONE HYDROCHLORIDE 0.4 MG/ML
0.1 INJECTION, SOLUTION INTRAMUSCULAR; INTRAVENOUS; SUBCUTANEOUS AS NEEDED
Status: DISCONTINUED | OUTPATIENT
Start: 2020-12-15 | End: 2020-12-15 | Stop reason: HOSPADM

## 2020-12-15 RX ORDER — PROPOFOL 10 MG/ML
INJECTION, EMULSION INTRAVENOUS AS NEEDED
Status: DISCONTINUED | OUTPATIENT
Start: 2020-12-15 | End: 2020-12-15 | Stop reason: HOSPADM

## 2020-12-15 RX ORDER — ALBUTEROL SULFATE 0.83 MG/ML
2.5 SOLUTION RESPIRATORY (INHALATION) AS NEEDED
Status: DISCONTINUED | OUTPATIENT
Start: 2020-12-15 | End: 2020-12-15 | Stop reason: HOSPADM

## 2020-12-15 RX ORDER — DIPHENHYDRAMINE HYDROCHLORIDE 50 MG/ML
12.5 INJECTION, SOLUTION INTRAMUSCULAR; INTRAVENOUS
Status: DISCONTINUED | OUTPATIENT
Start: 2020-12-15 | End: 2020-12-15 | Stop reason: HOSPADM

## 2020-12-15 RX ORDER — HYDROCODONE BITARTRATE AND ACETAMINOPHEN 5; 325 MG/1; MG/1
1 TABLET ORAL
Qty: 28 TAB | Refills: 0 | Status: SHIPPED | OUTPATIENT
Start: 2020-12-15 | End: 2020-12-18

## 2020-12-15 RX ORDER — ROCURONIUM BROMIDE 10 MG/ML
INJECTION, SOLUTION INTRAVENOUS AS NEEDED
Status: DISCONTINUED | OUTPATIENT
Start: 2020-12-15 | End: 2020-12-15 | Stop reason: HOSPADM

## 2020-12-15 RX ORDER — HYDROMORPHONE HYDROCHLORIDE 2 MG/ML
0.5 INJECTION, SOLUTION INTRAMUSCULAR; INTRAVENOUS; SUBCUTANEOUS
Status: COMPLETED | OUTPATIENT
Start: 2020-12-15 | End: 2020-12-15

## 2020-12-15 RX ORDER — MIDAZOLAM HYDROCHLORIDE 1 MG/ML
2 INJECTION, SOLUTION INTRAMUSCULAR; INTRAVENOUS
Status: DISCONTINUED | OUTPATIENT
Start: 2020-12-15 | End: 2020-12-15 | Stop reason: HOSPADM

## 2020-12-15 RX ORDER — LIDOCAINE HYDROCHLORIDE 10 MG/ML
0.1 INJECTION INFILTRATION; PERINEURAL AS NEEDED
Status: DISCONTINUED | OUTPATIENT
Start: 2020-12-15 | End: 2020-12-15 | Stop reason: HOSPADM

## 2020-12-15 RX ADMIN — FENTANYL CITRATE 100 MCG: 50 INJECTION INTRAMUSCULAR; INTRAVENOUS at 09:26

## 2020-12-15 RX ADMIN — HYDROMORPHONE HYDROCHLORIDE 0.5 MG: 2 INJECTION INTRAMUSCULAR; INTRAVENOUS; SUBCUTANEOUS at 10:40

## 2020-12-15 RX ADMIN — PROPOFOL 200 MG: 10 INJECTION, EMULSION INTRAVENOUS at 09:10

## 2020-12-15 RX ADMIN — HYDROMORPHONE HYDROCHLORIDE 0.5 MG: 2 INJECTION INTRAMUSCULAR; INTRAVENOUS; SUBCUTANEOUS at 11:00

## 2020-12-15 RX ADMIN — ONDANSETRON 4 MG: 2 INJECTION INTRAMUSCULAR; INTRAVENOUS at 10:07

## 2020-12-15 RX ADMIN — OXYCODONE HYDROCHLORIDE 10 MG: 5 TABLET ORAL at 10:50

## 2020-12-15 RX ADMIN — DEXAMETHASONE SODIUM PHOSPHATE 8 MG: 4 INJECTION, SOLUTION INTRAMUSCULAR; INTRAVENOUS at 09:35

## 2020-12-15 RX ADMIN — Medication 3 AMPULE: at 07:50

## 2020-12-15 RX ADMIN — CLINDAMYCIN PHOSPHATE 900 MG: 900 INJECTION, SOLUTION INTRAVENOUS at 09:24

## 2020-12-15 RX ADMIN — HYDROMORPHONE HYDROCHLORIDE 0.5 MG: 2 INJECTION INTRAMUSCULAR; INTRAVENOUS; SUBCUTANEOUS at 11:50

## 2020-12-15 RX ADMIN — SODIUM CHLORIDE, SODIUM LACTATE, POTASSIUM CHLORIDE, AND CALCIUM CHLORIDE: 600; 310; 30; 20 INJECTION, SOLUTION INTRAVENOUS at 10:22

## 2020-12-15 RX ADMIN — HYDROMORPHONE HYDROCHLORIDE 0.5 MG: 2 INJECTION INTRAMUSCULAR; INTRAVENOUS; SUBCUTANEOUS at 10:45

## 2020-12-15 RX ADMIN — ROCURONIUM BROMIDE 50 MG: 10 INJECTION, SOLUTION INTRAVENOUS at 09:10

## 2020-12-15 RX ADMIN — HYDROMORPHONE HYDROCHLORIDE 0.5 MG: 2 INJECTION INTRAMUSCULAR; INTRAVENOUS; SUBCUTANEOUS at 12:08

## 2020-12-15 RX ADMIN — ESMOLOL HYDROCHLORIDE 30 MG: 10 INJECTION, SOLUTION INTRAVENOUS at 09:38

## 2020-12-15 RX ADMIN — SODIUM CHLORIDE, SODIUM LACTATE, POTASSIUM CHLORIDE, AND CALCIUM CHLORIDE 100 ML/HR: 600; 310; 30; 20 INJECTION, SOLUTION INTRAVENOUS at 07:56

## 2020-12-15 RX ADMIN — LIDOCAINE HYDROCHLORIDE 120 MG: 20 INJECTION, SOLUTION EPIDURAL; INFILTRATION; INTRACAUDAL; PERINEURAL at 09:44

## 2020-12-15 RX ADMIN — LIDOCAINE HYDROCHLORIDE 80 MG: 20 INJECTION, SOLUTION EPIDURAL; INFILTRATION; INTRACAUDAL; PERINEURAL at 09:10

## 2020-12-15 RX ADMIN — Medication 3 MG: at 10:07

## 2020-12-15 RX ADMIN — HYDROMORPHONE HYDROCHLORIDE 0.5 MG: 2 INJECTION INTRAMUSCULAR; INTRAVENOUS; SUBCUTANEOUS at 10:55

## 2020-12-15 RX ADMIN — ESMOLOL HYDROCHLORIDE 30 MG: 10 INJECTION, SOLUTION INTRAVENOUS at 09:31

## 2020-12-15 RX ADMIN — GLYCOPYRROLATE 0.4 MG: 0.2 INJECTION, SOLUTION INTRAMUSCULAR; INTRAVENOUS at 10:07

## 2020-12-15 RX ADMIN — FENTANYL CITRATE 100 MCG: 50 INJECTION INTRAMUSCULAR; INTRAVENOUS at 09:10

## 2020-12-15 RX ADMIN — MIDAZOLAM 2 MG: 1 INJECTION INTRAMUSCULAR; INTRAVENOUS at 09:00

## 2020-12-15 RX ADMIN — HYDROMORPHONE HYDROCHLORIDE 0.5 MG: 2 INJECTION INTRAMUSCULAR; INTRAVENOUS; SUBCUTANEOUS at 11:45

## 2020-12-15 RX ADMIN — HYDROMORPHONE HYDROCHLORIDE 0.5 MG: 2 INJECTION INTRAMUSCULAR; INTRAVENOUS; SUBCUTANEOUS at 12:00

## 2020-12-15 NOTE — H&P
Chief complaint: Back and leg pain. History of present illness:      She continues to have intractable back, left buttock, coccygeal, posterior thigh, and leg pain despite a left-sided L5-S1 discectomy which was performed about 1 year ago. This has required intermittent leave from work. She has tried ergonomic changes. She has tried medications including gabapentin, hydrocodone, tramadol. She has had epidural injections and selective nerve root blocks. She has tried physical therapy and chiropractic care. She is really at her wits end here. Her pain remains rated 10/10. She is interested in pursuing revision surgery if indicated. PMHx/PSHx/Social History/Medications/Allergies/ROS are documented separately and have been reviewed. ROS: Denies fever, chills, chest pain. ????? Medications: Amitriptyline HCl;Amphetamine ER;Gabapentin (100 MG, take 1 po qhs titrate up to 100 mg tid prn); Lexapro; Lulla English; Medrol (4 MG, take by mouth per package instructions); Norco (5-325 MG, Take 1 tablet(s) by mouth every 6 hours as needed [PRN] pain); TraMADol HCl (50 MG, Take 2 tablet(s) by mouth every 6 hours as needed [PRN pain]);Ultram (50 MG, Take 1 tablet(s) by mouth every 6 hours as needed [PRN])  ????? Allergies: Penicillin  ?????    Physical Exam: This is a well developed well nourished adult female in no acute distress. Mood and affect are appropriate. Oriented to person, place, and time. Head is normocephalic and atraumatic. Extraocular movements intact. Sclera anicteric. Respirations are unlabored and there is no evidence of cyanosis. Chest is clear to auscultation. Heart is regular rate and rhythm. Abdomen is soft. Straight leg testing is positive on the left. There is subjective light touch sensory loss over the the left buttock, posterior thigh, lateral shin and calf area.     Reflexes   Right Left   Quadriceps (L4) 2 2   Achilles (S1) 2 2     Ankle jerk is negative for clonus    Strength testing in the lower extremity reveals the following based on the 5 point grading scale:     HF (L2) H Ab (L5) KE (L3/4) ADF (L4) EHL (L5) A Ev (S1) APF (S1)   Right 5 5 5 5 5 5 5   Left 5 5 5 4 4 5 5     The feet are warm with good capillary refill and palpable pedal pulses. Radiographic Studies:    Her postoperative MRI does show postoperative changes on the left at L5-S1 that appear appropriate. There may be a small recurrent disc herniation causing S1 impingement. There is also some stenosis at L4-L5. Assessment/Plan: This patients clinical history and physical exam is consistent with prolonged intractable lumbar radiculopathy and back pain. She is mostly interested in effusion. However, I advised against this due to her age. I discussed that the surgical option should be directed towards her radicular symptoms. Conservative efforts have been reasonably exhausted and the patient feels like she cannot go on with the symptoms as they are. We have previously discussed surgical options and now she would like to proceed with surgical scheduling.  ????? The imaging stugy shows multifactorial lateral recess stenosis and a hemilaminectomy and revision discectomy is recommended to decompress the neurologic structures involved. We discussed the details of the surgery including a midline incision in over the low back followed by dissection to the area of stenosis. The nerves would be freed up by trimming any impinging structures including disc, ligaments and bone. Once the nerves are freed the wound would be closed with suture and covered with sterile dressings. The patient would expect to stay in recovery for observation or overnight in the hospital depending on how quickly she recovers. Follow-up would be scheduled for 2-3 weeks and she would have restrictions including no driving, and no lifting greater than 15 lbs until follow up with me.   We also discussed the potential risks of the surgery including, but not limited to infection, spinal fluid leak and potential headaches requiring her to remain supine or have a lumbar drain inserted post-operatively; injury to the cauda equina or peripheral nerve root resulting in paralysis, bowel or bladder dysfunction, or loss of use of an extremity; persistent back or leg symptoms, recurrence of stenosis or the development of instability possibly needing additional surgery;  blood loss requiring transfusion; and the risks of anesthesia including, but not limited heart attack, stroke, and blood clot. The patient voiced an understanding of these issues and would like to discuss them over with her family and will get back with me with her desired treatment course. The surgery that I believe would be most beneficial here is a left L5-S1 revision discectomy and left-sided L4 hemilaminectomy. Koffi table with sling.           Electronically Signed By Blas Barraza MD

## 2020-12-15 NOTE — ANESTHESIA POSTPROCEDURE EVALUATION
Procedure(s):  LEFT L5 S1 REVISION DISCECTOMY/ LEFT L4 HEMILAMINECTOMY. general    Anesthesia Post Evaluation      Multimodal analgesia: multimodal analgesia used between 6 hours prior to anesthesia start to PACU discharge  Patient location during evaluation: PACU  Patient participation: complete - patient participated  Level of consciousness: awake and awake and alert  Pain management: adequate  Airway patency: patent  Anesthetic complications: no  Cardiovascular status: acceptable  Respiratory status: acceptable  Hydration status: acceptable  Post anesthesia nausea and vomiting:  controlled      INITIAL Post-op Vital signs:   Vitals Value Taken Time   /60 12/15/2020 12:30 PM   Temp 36.7 °C (98.1 °F) 12/15/2020 11:45 AM   Pulse 87 12/15/2020 12:35 PM   Resp 16 12/15/2020 12:30 PM   SpO2 95 % 12/15/2020 12:35 PM   Vitals shown include unvalidated device data.

## 2020-12-15 NOTE — OP NOTES
[address] [address]   [phone]    OPERATIVE REPORT    Patient Inga Quesada  [id number]  1995  30 y.o. DATE OF SURGERY: 12/15/2020    SURGEON: Dr. Cricket Posadas. PREOPERATIVE DIAGNOSIS:     1. Recurrent lumbar disc herniation L5-S1 s/p prior diskectomy  2. Left L4 lateral recess stenosis    POSTOPERATIVE DIAGNOSIS:     1. Recurrent lumbar disc herniation L5-S1 s/p prior diskectomy  2. Left L4 lateral recess stenosis    PROCEDURE:    1. Revision lumbar diskectomy left  L5-S1 (13698)   2. Left L4 hemilaminectomy (68046)    ANESTHESIA: General.    ESTIMATED BLOOD LOSS: 50 ml    POSTOPERATIVE CONDITION: Stable. INTRAOPERATIVE COMPLICATIONS: None. INDICATIONS FOR PROCEDURE: Back and leg pain consistent with claudication/lumbar radiculitis that is no longer responsive to conservative measures. Walking and standing tolerances have diminished. Imaging studies are concordant, showing lumbar stenosis in the area of a prior lumbar decompression. In the outpatient setting, the risks, benefits, and potential complications of the above listed procedure were discussed and an informed consent was obtained. DESCRIPTION OF PROCEDURE: After adequate induction of general anesthesia, the patient was positioned prone on the [facility] spinal table. Care was taken to pad all bony prominences. The shoulders and elbows were placed in the 90/90 position. The abdomen was allowed to hang free to decrease intraabdominal and venous pressure. The lumbar area was prepped and draped in the usual sterile fashion. Preoperative antibiotic was administered. A time out was called to confirm the appropriate patient, proposed procedure and proposed incision sites. With this conformation, an incision was created midline, over the lumbar spinous processes. Dissection was carried down to the lumbodorsal fascia.  The lumbodorsal fascia and paraspinous musculature were elevated in a subperiosteal fashion, laterally off of the spinous processes and lamina on the left side. A curet was slipped beneath the lamina and a cross table fluoroscopic image was obtained to identify the appropriate spinal level. The pars interarticularis was exposed. Care was taken to preserve the facet capsule at each level. The deep retractors were placed to facilitate visualization. The 4 mm dmitriy was used to thin the remaining lamina to an eggshell like thickness. The operative microscope was brought to the field and used to visualize the neural elements during the decompression. A triple-0 angled curet was used to elevated the scar off of its origin on the caudal surface of the L5 lamina as well as off the cephalad surface of the adjacent lamina. The scar was elevated from the thecal sac and a plane was created in the epidural space with the [facility]. A 4 mm Kerrison was used to perform a revision left hemilaminectomy of  L5 - S1 . The hemilaminectomy was widened to the medial border of the pedicle. A 4 mm Kerrison was used to under cut the lateral recess. The nerve root was retracted medially again with the Salome nerve root retractor. The underlying disk was identified and inspected. A recurrent disc herniation was noted and trimmed. The nerve was released from retraction and the area inspected and palpated with a nerve hook for adequacy of decompression. This was satisfactory. There was felt to be no significant facet instability and a fusion was not deemed to be necessary. The lumbodorsal fascia was released on the left  side of L4 and dissection was carried down to the lamina and pars interarticularis. The operating microscope was brought to the surgical field. The ligamentum flavum was then elevated off of its insertion on the   L4  lamina. A 4 mm Kerrison was used to perform a hemilaminectomy of L4.  The ligamentum flavum was carefully elevated off of the thecal sac and excised with the Kerrison rongeur. The descending nerve root was identified and retracted medially. The lateral recess was undercut laterally to the pedicle. A foraminotomy was performed to decompress the exiting nerve root. The nerve root was retracted medially again with the Salome nerve root retractor. The underlying disk was identified and inspected, but not violated. The nerve was released from retraction and the area inspected and palpated with a nerve hook for adequacy of decompression. This was satisfactory. With the decompression completed, the wound was liberally irrigated with saline solution. Floseal and vancomycin were spread. The lumbodorsal fascia was approximated with a #1 Vicryl suture in an interrupted fashion. The subcutaneous tissue and skin were approximated in a layered fashion. Benzoin and Steri-Strips were applied. Sterile dressings were applied. The patient tolerated the procedure well and was returned to the postanesthesia care unit in stable condition. At the end of the case, all sponge, needle, and instrument counts were correct.        Benjamin Huang MD

## 2020-12-15 NOTE — PERIOP NOTES
Discharge instructions discussed with Bud Madsen, friend at bedside, no questions or concerns at this time. Hard copy of instructions given to friend.

## 2020-12-16 NOTE — ADDENDUM NOTE
Addendum  created 12/16/20 0745 by Linwood Ramos CRNA    Flowsheet accepted, Intraprocedure Flowsheets edited

## 2021-04-13 ENCOUNTER — HOSPITAL ENCOUNTER (EMERGENCY)
Age: 26
Discharge: HOME | End: 2021-04-13
Attending: EMERGENCY MEDICINE
Payer: COMMERCIAL

## 2021-04-13 ENCOUNTER — APPOINTMENT (OUTPATIENT)
Dept: GENERAL RADIOLOGY | Age: 26
End: 2021-04-13
Attending: EMERGENCY MEDICINE
Payer: COMMERCIAL

## 2021-04-13 VITALS
DIASTOLIC BLOOD PRESSURE: 76 MMHG | BODY MASS INDEX: 29.16 KG/M2 | SYSTOLIC BLOOD PRESSURE: 126 MMHG | HEIGHT: 65 IN | TEMPERATURE: 98.3 F | HEART RATE: 83 BPM | WEIGHT: 175 LBS | RESPIRATION RATE: 18 BRPM | OXYGEN SATURATION: 99 %

## 2021-04-13 DIAGNOSIS — S61.211A LACERATION OF LEFT INDEX FINGER WITHOUT FOREIGN BODY WITHOUT DAMAGE TO NAIL, INITIAL ENCOUNTER: Primary | ICD-10-CM

## 2021-04-13 PROCEDURE — 99281 EMR DPT VST MAYX REQ PHY/QHP: CPT

## 2021-04-13 PROCEDURE — 90471 IMMUNIZATION ADMIN: CPT

## 2021-04-13 PROCEDURE — 74011250636 HC RX REV CODE- 250/636: Performed by: EMERGENCY MEDICINE

## 2021-04-13 PROCEDURE — 75810000293 HC SIMP/SUPERF WND  RPR

## 2021-04-13 PROCEDURE — 73130 X-RAY EXAM OF HAND: CPT

## 2021-04-13 PROCEDURE — 90715 TDAP VACCINE 7 YRS/> IM: CPT | Performed by: EMERGENCY MEDICINE

## 2021-04-13 RX ADMIN — TETANUS TOXOID, REDUCED DIPHTHERIA TOXOID AND ACELLULAR PERTUSSIS VACCINE, ADSORBED 0.5 ML: 5; 2.5; 8; 8; 2.5 SUSPENSION INTRAMUSCULAR at 22:49

## 2021-04-14 NOTE — DISCHARGE INSTRUCTIONS
Keep area clean may gently wash with soap and water, then cover, Tylenol Motrin for pain, see your primary care physician or return to ER for suture removal in 10 to 12 days watch for any signs of infection

## 2021-04-14 NOTE — ED PROVIDER NOTES
Patient to ER complaining of laceration to the left index finger. She states she was cutting an avocado when the knife slipped. She is right-hand dominant she is unknown on last tetanus    The history is provided by the patient. Laceration   The incident occurred less than 1 hour ago. The laceration is located on the left hand. The laceration is 2 cm in size. The injury mechanism is a clean knife. Foreign body present: no. The pain is at a severity of 7/10. The pain is mild. The pain has been constant since onset. Pertinent negatives include no numbness, no tingling, no loss of motion, no coolness and no discoloration. It is unknown when the patient last had a tetanus shot. Past Medical History:   Diagnosis Date    Abnormal Papanicolaou smear of cervix     no treatment    ADD (attention deficit disorder) 2018    Cold sore     Depression     Hashimoto's thyroiditis 07/2018    managed with medications    History of chicken pox     Hypertension     managed with medications    Insomnia     elavil -> wt gain, trazodone -> anxiety, atarax,ambien -> ineffective    Lumbar facet arthropathy 2018    s/p HEBER without relief. pain mgmt felt she was surgical - can't afford to do surgery at this time    Migraines     elavil -> caused signifcant wt gain    PCOS (polycystic ovarian syndrome)     metformin didn't help       Past Surgical History:   Procedure Laterality Date    HX LUMBAR LAMINECTOMY  12/17/2019    left L5/S1 hemilaminectomy w/ discectomy.  revision 12/2020    HX WISDOM TEETH EXTRACTION           Family History:   Problem Relation Age of Onset    Hypertension Mother     Hypertension Father 37    Elevated Lipids Father     Cancer Father         melanoma x 2    Diabetes Father     Attention Deficit Disorder Brother     Diabetes Paternal Grandfather        Social History     Socioeconomic History    Marital status: SINGLE     Spouse name: Not on file    Number of children: Not on file    Years of education: Not on file    Highest education level: Not on file   Occupational History    Not on file   Social Needs    Financial resource strain: Not on file    Food insecurity     Worry: Not on file     Inability: Not on file    Transportation needs     Medical: Not on file     Non-medical: Not on file   Tobacco Use    Smoking status: Never Smoker    Smokeless tobacco: Never Used   Substance and Sexual Activity    Alcohol use: Yes     Alcohol/week: 0.0 standard drinks     Comment: social occasions    Drug use: No    Sexual activity: Yes     Partners: Male     Birth control/protection: Pill   Lifestyle    Physical activity     Days per week: Not on file     Minutes per session: Not on file    Stress: Not on file   Relationships    Social connections     Talks on phone: Not on file     Gets together: Not on file     Attends [employer] service: Not on file     Active member of club or organization: Not on file     Attends meetings of clubs or organizations: Not on file     Relationship status: Not on file    Intimate partner violence     Fear of current or ex partner: Not on file     Emotionally abused: Not on file     Physically abused: Not on file     Forced sexual activity: Not on file   Other Topics Concern    Not on file   Social History Narrative    Not on file         ALLERGIES: Pcn [penicillins]    Review of Systems   Neurological: Negative for tingling and numbness. All other systems reviewed and are negative. Vitals:    04/13/21 2121   BP: 126/76   Pulse: 83   Resp: 18   Temp: 98.3 °F (36.8 °C)   SpO2: 99%   Weight: 79.4 kg (175 lb)   Height: 5' 5\" (1.651 m)            Physical Exam  Vitals signs and nursing note reviewed. Constitutional:       General: She is not in acute distress. Appearance: Normal appearance. She is well-developed and normal weight. She is not diaphoretic. HENT:      Head: Normocephalic and atraumatic.    Eyes:      Pupils: Pupils are equal, round, and reactive to light. Neck:      Musculoskeletal: Normal range of motion and neck supple. Cardiovascular:      Rate and Rhythm: Normal rate and regular rhythm. Pulmonary:      Effort: Pulmonary effort is normal.      Breath sounds: Normal breath sounds. Abdominal:      General: Bowel sounds are normal.      Palpations: Abdomen is soft. Musculoskeletal: Normal range of motion. General: Tenderness and signs of injury present. No deformity. Comments: Left index finger with 2 cm laceration to the medial aspect adjacent to the MCP joint. Full flexion-extension noted to the finger full sensation noted   Skin:     General: Skin is warm. Neurological:      General: No focal deficit present. Mental Status: She is alert and oriented to person, place, and time. Psychiatric:         Mood and Affect: Mood normal.         Behavior: Behavior normal.          MDM  Number of Diagnoses or Management Options  Diagnosis management comments: X-rays negative for any fracture or foreign body. 2 cm laceration cleaned and closed with 6 interrupted sutures without complication, tetanus updated  Patient to return to ER or see primary care physician for suture removal in 10 days, use Tylenol  or Motrin for pain watch for any signs of infection       Amount and/or Complexity of Data Reviewed  Tests in the radiology section of CPT®: ordered and reviewed  Review and summarize past medical records: yes    Risk of Complications, Morbidity, and/or Mortality  Presenting problems: moderate  Diagnostic procedures: moderate  Management options: low    Patient Progress  Patient progress: improved         Wound Repair    Date/Time: 4/13/2021 11:14 PM  Performed by: [employer] provider: Darwin  Preparation: skin prepped with Betadine  Pre-procedure re-eval: Immediately prior to the procedure, the patient was reevaluated and found suitable for the planned procedure and any planned medications.   Time out: Immediately prior to the procedure a time out was called to verify the correct patient, procedure, equipment, staff and marking as appropriate. .  Location details: left index finger  Wound length:2.5 cm or less  Anesthesia: local infiltration    Anesthesia:  Local Anesthetic: lidocaine 1% without epinephrine  Anesthetic total: 5 mL  Foreign bodies: no foreign bodies  Debridement: none  Skin closure: 4-0 nylon  Number of sutures: 6  Technique: simple and interrupted  Approximation: close  Dressing: 4x4, antibiotic ointment and gauze roll  My total time at bedside, performing this procedure was 1-15 minutes.

## 2021-04-14 NOTE — ED NOTES
Laceration dressed with nonstick dressing and gauze wrap. Pt instructed on wound care. I have reviewed discharge instructions with the patient. The patient verbalized understanding. Patient left ED via Discharge Method: ambulatory to Home with family. Opportunity for questions and clarification provided. Patient given 0 scripts.

## 2022-03-18 PROBLEM — F90.0 ATTENTION DEFICIT HYPERACTIVITY DISORDER (ADHD), PREDOMINANTLY INATTENTIVE TYPE: Status: ACTIVE | Noted: 2018-02-08

## 2022-03-18 PROBLEM — M51.26 LUMBAR DISC HERNIATION: Status: ACTIVE | Noted: 2018-05-21

## 2022-03-18 PROBLEM — G47.09 OTHER INSOMNIA: Status: ACTIVE | Noted: 2018-02-08

## 2022-03-19 PROBLEM — G43.909 MIGRAINES: Status: ACTIVE | Noted: 2018-02-08

## 2022-03-19 PROBLEM — J30.2 SEASONAL ALLERGIC RHINITIS: Status: ACTIVE | Noted: 2018-04-30

## 2022-03-19 PROBLEM — M48.062 LUMBAR STENOSIS WITH NEUROGENIC CLAUDICATION: Status: ACTIVE | Noted: 2019-12-17

## 2022-03-19 PROBLEM — Z92.89 HISTORY OF DIAGNOSTIC TESTS: Status: ACTIVE | Noted: 2019-04-29

## 2022-03-19 PROBLEM — M47.816 FACET ARTHROPATHY, LUMBAR: Status: ACTIVE | Noted: 2018-05-21

## 2022-03-19 PROBLEM — D72.829 LEUKOCYTOSIS: Status: ACTIVE | Noted: 2018-07-16

## 2022-03-19 PROBLEM — I10 ESSENTIAL HYPERTENSION: Status: ACTIVE | Noted: 2018-07-09

## 2022-03-20 PROBLEM — E66.811 CLASS 1 OBESITY DUE TO EXCESS CALORIES WITH SERIOUS COMORBIDITY AND BODY MASS INDEX (BMI) OF 34.0 TO 34.9 IN ADULT: Status: ACTIVE | Noted: 2018-10-03

## 2022-03-20 PROBLEM — R00.2 PALPITATIONS: Status: ACTIVE | Noted: 2019-04-18

## 2022-03-20 PROBLEM — F41.9 ANXIETY AND DEPRESSION: Status: ACTIVE | Noted: 2018-02-08

## 2022-03-20 PROBLEM — E78.00 HIGH CHOLESTEROL: Status: ACTIVE | Noted: 2018-10-03

## 2022-03-20 PROBLEM — E66.09 CLASS 1 OBESITY DUE TO EXCESS CALORIES WITH SERIOUS COMORBIDITY AND BODY MASS INDEX (BMI) OF 34.0 TO 34.9 IN ADULT: Status: ACTIVE | Noted: 2018-10-03

## 2022-03-20 PROBLEM — E06.3 HASHIMOTO'S THYROIDITIS: Status: ACTIVE | Noted: 2018-07-12

## 2022-03-20 PROBLEM — F32.A ANXIETY AND DEPRESSION: Status: ACTIVE | Noted: 2018-02-08

## 2022-06-22 ENCOUNTER — TELEMEDICINE (OUTPATIENT)
Dept: FAMILY MEDICINE CLINIC | Facility: CLINIC | Age: 27
End: 2022-06-22
Payer: COMMERCIAL

## 2022-06-22 DIAGNOSIS — R60.9 BODY FLUID RETENTION: ICD-10-CM

## 2022-06-22 DIAGNOSIS — Z00.00 ROUTINE HEALTH MAINTENANCE: ICD-10-CM

## 2022-06-22 DIAGNOSIS — I10 ESSENTIAL HYPERTENSION: Primary | ICD-10-CM

## 2022-06-22 DIAGNOSIS — E06.3 HASHIMOTO'S THYROIDITIS: ICD-10-CM

## 2022-06-22 DIAGNOSIS — Z11.59 NEED FOR HEPATITIS C SCREENING TEST: ICD-10-CM

## 2022-06-22 DIAGNOSIS — F90.0 ATTENTION DEFICIT HYPERACTIVITY DISORDER (ADHD), PREDOMINANTLY INATTENTIVE TYPE: ICD-10-CM

## 2022-06-22 PROCEDURE — 99214 OFFICE O/P EST MOD 30 MIN: CPT | Performed by: FAMILY MEDICINE

## 2022-06-22 RX ORDER — DEXTROAMPHETAMINE SACCHARATE, AMPHETAMINE ASPARTATE MONOHYDRATE, DEXTROAMPHETAMINE SULFATE AND AMPHETAMINE SULFATE 5; 5; 5; 5 MG/1; MG/1; MG/1; MG/1
20 CAPSULE, EXTENDED RELEASE ORAL 2 TIMES DAILY
Qty: 60 CAPSULE | Refills: 0 | Status: SHIPPED | OUTPATIENT
Start: 2022-06-23 | End: 2022-08-17 | Stop reason: SDUPTHER

## 2022-06-22 NOTE — PROGRESS NOTES
Stephanie Ballesteros is a 32 y.o. female who was seen by synchronous (real-time) audio-video technology on 6/22/2022. Subjective:   Stephanie Ballesteros was seen for Medication Refill  Pt had a breast lift while in [facility] to help with her thoracic back pain. They did a tummy tuck at the same time. This was all done 3/15/22. Had some liquid build up in her abdomen, had to have multiple fluid aspirations. plastic surgeon wants her to have an US to eval for seroma. She is 3 mos post op. No fevers. She has continued on norvasc, thyroid med and adderall  She doesn't feel that she can concentrate as well in the AM.     Allergies   Allergen Reactions    Penicillins Swelling     TONGUE SWELLS         Objective:     General: alert, cooperative and no distress   Mental  status: mental status: alert, oriented to person, place, and time, normal mood, behavior, speech, dress, motor activity, and thought processes   Resp: No distress. Neuro: No acute deficits   Skin: skin: no discoloration or lesions of concern on visible areas     Due to this being a TeleHealth evaluation, many elements of the physical examination are unable to be assessed. Assessment & Plan:   Marbella Begum was seen today for medication refill. Diagnoses and all orders for this visit:    Essential hypertension    Hashimoto's thyroiditis  -     TSH; Future  -     T4, Free; Future    Attention deficit hyperactivity disorder (ADHD), predominantly inattentive type  -     amphetamine-dextroamphetamine (ADDERALL XR) 20 MG extended release capsule; Take 1 capsule by mouth 2 times daily for 30 days. Body fluid retention  -     US ABDOMEN LIMITED; Future    Routine health maintenance  -     CBC with Auto Differential; Future  -     Comprehensive Metabolic Panel; Future  -     Lipid Panel; Future  -     Hepatitis C Antibody; Future  -     TSH;  Future  -     T4, Free; Future    Need for hepatitis C screening test  -     Hepatitis C Antibody; Future    check abd US  Instead of increasing dose of adderall, add vitD and B12 to daily regimen to see if this helps w/ concentration  She will be in for fasting labs and then CPE in the next month          CPT Codes 29031-66636 for Established Patients may apply to this Telehealth Visit    Alfonso Husain was evaluated through a synchronous (real-time) audio-video encounter. The patient (or guardian if applicable) is aware that this is a billable service, which includes applicable co-pays. This Virtual Visit was conducted with patient's (and/or leg guardian's) consent. The visit was conducted pursuant to the emergency declaration under the [address] [place] [address] waiver authority and the [employer] General Act. Patient identification was verified, and a caregiver was present when appropriate. The patient was located in a state where the provider was licensed to provide care. I was in the office while conducting this encounter. Pt was at home. We discussed the expected course, resolution and complications of the diagnosis(es) in detail. Medication risks, benefits, costs, interactions, and alternatives were discussed as indicated. I advised her to contact the office if her condition worsens, changes or fails to improve as anticipated. She expressed understanding with the diagnosis(es) and plan.      Siria Reyes,

## 2022-06-30 DIAGNOSIS — Z00.00 ROUTINE HEALTH MAINTENANCE: ICD-10-CM

## 2022-06-30 DIAGNOSIS — Z11.59 NEED FOR HEPATITIS C SCREENING TEST: ICD-10-CM

## 2022-06-30 DIAGNOSIS — E06.3 HASHIMOTO'S THYROIDITIS: ICD-10-CM

## 2022-07-01 ENCOUNTER — TELEPHONE (OUTPATIENT)
Dept: FAMILY MEDICINE CLINIC | Facility: CLINIC | Age: 27
End: 2022-07-01

## 2022-07-01 LAB
ALBUMIN SERPL-MCNC: 4 G/DL (ref 3.5–5)
ALBUMIN/GLOB SERPL: 1.2 {RATIO} (ref 1.2–3.5)
ALP SERPL-CCNC: 95 U/L (ref 50–136)
ALT SERPL-CCNC: 17 U/L (ref 12–65)
ANION GAP SERPL CALC-SCNC: 8 MMOL/L (ref 7–16)
AST SERPL-CCNC: 22 U/L (ref 15–37)
BILIRUB SERPL-MCNC: 0.4 MG/DL (ref 0.2–1.1)
BUN SERPL-MCNC: 11 MG/DL (ref 6–23)
CALCIUM SERPL-MCNC: 8.9 MG/DL (ref 8.3–10.4)
CHLORIDE SERPL-SCNC: 110 MMOL/L (ref 98–107)
CHOLEST SERPL-MCNC: 148 MG/DL
CO2 SERPL-SCNC: 21 MMOL/L (ref 21–32)
CREAT SERPL-MCNC: 0.7 MG/DL (ref 0.6–1)
GLOBULIN SER CALC-MCNC: 3.3 G/DL (ref 2.3–3.5)
GLUCOSE SERPL-MCNC: 85 MG/DL (ref 65–100)
HCV AB SER QL: NONREACTIVE
HDLC SERPL-MCNC: 33 MG/DL (ref 40–60)
HDLC SERPL: 4.5 {RATIO}
LDLC SERPL CALC-MCNC: 91.8 MG/DL
POTASSIUM SERPL-SCNC: 4 MMOL/L (ref 3.5–5.1)
PROT SERPL-MCNC: 7.3 G/DL (ref 6.3–8.2)
SODIUM SERPL-SCNC: 139 MMOL/L (ref 136–145)
T4 FREE SERPL-MCNC: 1.1 NG/DL (ref 0.78–1.46)
TRIGL SERPL-MCNC: 116 MG/DL (ref 35–150)
TSH, 3RD GENERATION: 1.45 UIU/ML (ref 0.36–3.74)
VLDLC SERPL CALC-MCNC: 23.2 MG/DL (ref 6–23)

## 2022-07-07 ENCOUNTER — TELEMEDICINE (OUTPATIENT)
Dept: FAMILY MEDICINE CLINIC | Facility: CLINIC | Age: 27
End: 2022-07-07
Payer: COMMERCIAL

## 2022-07-07 DIAGNOSIS — M47.816 FACET ARTHROPATHY, LUMBAR: Primary | ICD-10-CM

## 2022-07-07 DIAGNOSIS — E06.3 HASHIMOTO'S THYROIDITIS: ICD-10-CM

## 2022-07-07 DIAGNOSIS — I10 PRIMARY HYPERTENSION: ICD-10-CM

## 2022-07-07 PROCEDURE — 99214 OFFICE O/P EST MOD 30 MIN: CPT | Performed by: FAMILY MEDICINE

## 2022-07-07 RX ORDER — PREGABALIN 75 MG/1
CAPSULE ORAL
COMMUNITY
Start: 2021-09-28 | End: 2022-07-07 | Stop reason: SDUPTHER

## 2022-07-07 RX ORDER — PREGABALIN 75 MG/1
75 CAPSULE ORAL 2 TIMES DAILY
Qty: 180 CAPSULE | Refills: 1 | Status: SHIPPED | OUTPATIENT
Start: 2022-07-07 | End: 2023-01-03

## 2022-07-07 RX ORDER — LEVOTHYROXINE AND LIOTHYRONINE 38; 9 UG/1; UG/1
60 TABLET ORAL DAILY
Qty: 90 TABLET | Refills: 1 | Status: SHIPPED | OUTPATIENT
Start: 2022-07-07

## 2022-07-07 RX ORDER — AMLODIPINE BESYLATE 10 MG/1
10 TABLET ORAL DAILY
Qty: 90 TABLET | Refills: 1 | Status: SHIPPED | OUTPATIENT
Start: 2022-07-07

## 2022-07-07 ASSESSMENT — PATIENT HEALTH QUESTIONNAIRE - PHQ9
SUM OF ALL RESPONSES TO PHQ QUESTIONS 1-9: 0
2. FEELING DOWN, DEPRESSED OR HOPELESS: 0
SUM OF ALL RESPONSES TO PHQ QUESTIONS 1-9: 0
SUM OF ALL RESPONSES TO PHQ QUESTIONS 1-9: 0
1. LITTLE INTEREST OR PLEASURE IN DOING THINGS: 0
SUM OF ALL RESPONSES TO PHQ9 QUESTIONS 1 & 2: 0
SUM OF ALL RESPONSES TO PHQ QUESTIONS 1-9: 0

## 2022-07-07 NOTE — PROGRESS NOTES
Barb Manuel is a 32 y.o. female who was seen by synchronous (real-time) audio-video technology on 7/7/2022. Subjective:   Barb Manuel was seen for Other [facility] certification form)  pt is needing recert of her FMLA for her low back. Taking lyrica 75 BID. Still has flare ups that prevents her from going work. She's down to 165 lbs since her recent surgery. US was going to be $1k so she isn't going to do it. She has CPE scheduled for 8/4    Allergies   Allergen Reactions    Penicillins Swelling     TONGUE SWELLS         Objective:     General: alert, cooperative and no distress   Mental  status: mental status: alert, oriented to person, place, and time, normal mood, behavior, speech, dress, motor activity, and thought processes   Resp: No distress. Neuro: No acute deficits   Skin: skin: no discoloration or lesions of concern on visible areas     Due to this being a TeleHealth evaluation, many elements of the physical examination are unable to be assessed. Assessment & Plan:   Evan Hobbs was seen today for other. Diagnoses and all orders for this visit:    Facet arthropathy, lumbar  -     pregabalin (LYRICA) 75 MG capsule; Take 1 capsule by mouth 2 times daily for 180 days. Primary hypertension  -     amLODIPine (NORVASC) 10 MG tablet; Take 1 tablet by mouth daily    Hashimoto's thyroiditis  -     thyroid (ARMOUR) 60 MG tablet; Take 1 tablet by mouth daily        Forms completed and to be faxed. Labs reviewed today - cbc wasn't done, letting OM know about another error with lab. meds refilled. CPT Codes 35442-33584 for Established Patients may apply to this Telehealth Visit    Barb Manuel was evaluated through a synchronous (real-time) audio-video encounter. The patient (or guardian if applicable) is aware that this is a billable service, which includes applicable co-pays. This Virtual Visit was conducted with patient's (and/or leg guardian's) consent.  The visit was conducted pursuant to the emergency declaration under the [facility] waiver authority and the [employer] General Act. Patient identification was verified, and a caregiver was present when appropriate. The patient was located in a state where the provider was licensed to provide care. I was in the office while conducting this encounter. Pt was at home. We discussed the expected course, resolution and complications of the diagnosis(es) in detail. Medication risks, benefits, costs, interactions, and alternatives were discussed as indicated. I advised her to contact the office if her condition worsens, changes or fails to improve as anticipated. She expressed understanding with the diagnosis(es) and plan.      Manasa Waller, DO

## 2022-07-08 LAB
BASOPHILS # BLD: 0.1 K/UL (ref 0–0.2)
BASOPHILS NFR BLD: 1 % (ref 0–2)
DIFFERENTIAL METHOD BLD: ABNORMAL
EOSINOPHIL # BLD: 0 K/UL (ref 0–0.8)
EOSINOPHIL NFR BLD: 0 % (ref 0.5–7.8)
ERYTHROCYTE [DISTWIDTH] IN BLOOD BY AUTOMATED COUNT: 15.9 % (ref 11.9–14.6)
HCT VFR BLD AUTO: 49.8 % (ref 35.8–46.3)
HGB BLD-MCNC: 14.4 G/DL (ref 11.7–15.4)
IMM GRANULOCYTES # BLD AUTO: 0.1 K/UL (ref 0–0.5)
IMM GRANULOCYTES NFR BLD AUTO: 1 % (ref 0–5)
LYMPHOCYTES # BLD: 2.2 K/UL (ref 0.5–4.6)
LYMPHOCYTES NFR BLD: 22 % (ref 13–44)
MCH RBC QN AUTO: 30.2 PG (ref 26.1–32.9)
MCHC RBC AUTO-ENTMCNC: 28.9 G/DL (ref 31.4–35)
MCV RBC AUTO: 104.4 FL (ref 79.6–97.8)
MONOCYTES # BLD: 0.5 K/UL (ref 0.1–1.3)
MONOCYTES NFR BLD: 5 % (ref 4–12)
NEUTS SEG # BLD: 7 K/UL (ref 1.7–8.2)
NEUTS SEG NFR BLD: 71 % (ref 43–78)
NRBC # BLD: 0 K/UL (ref 0–0.2)
PLATELET # BLD AUTO: 265 K/UL (ref 150–450)
PLATELET COMMENT: ADEQUATE
PMV BLD AUTO: 12.2 FL (ref 9.4–12.3)
RBC # BLD AUTO: 4.77 M/UL (ref 4.05–5.2)
RBC MORPH BLD: ABNORMAL
WBC # BLD AUTO: 9.9 K/UL (ref 4.3–11.1)
WBC MORPH BLD: ABNORMAL

## 2022-08-17 ENCOUNTER — OFFICE VISIT (OUTPATIENT)
Dept: FAMILY MEDICINE CLINIC | Facility: CLINIC | Age: 27
End: 2022-08-17
Payer: COMMERCIAL

## 2022-08-17 VITALS
DIASTOLIC BLOOD PRESSURE: 89 MMHG | WEIGHT: 186 LBS | HEART RATE: 103 BPM | BODY MASS INDEX: 31.76 KG/M2 | HEIGHT: 64 IN | SYSTOLIC BLOOD PRESSURE: 129 MMHG | TEMPERATURE: 96.9 F | OXYGEN SATURATION: 99 %

## 2022-08-17 DIAGNOSIS — Z00.00 ANNUAL PHYSICAL EXAM: Primary | ICD-10-CM

## 2022-08-17 DIAGNOSIS — M47.816 FACET ARTHROPATHY, LUMBAR: ICD-10-CM

## 2022-08-17 DIAGNOSIS — F90.0 ATTENTION DEFICIT HYPERACTIVITY DISORDER (ADHD), PREDOMINANTLY INATTENTIVE TYPE: ICD-10-CM

## 2022-08-17 DIAGNOSIS — D75.89 INCREASED MCV: ICD-10-CM

## 2022-08-17 LAB
BASOPHILS # BLD: 0.1 K/UL (ref 0–0.2)
BASOPHILS NFR BLD: 1 % (ref 0–2)
DIFFERENTIAL METHOD BLD: ABNORMAL
EOSINOPHIL # BLD: 0.1 K/UL (ref 0–0.8)
EOSINOPHIL NFR BLD: 1 % (ref 0.5–7.8)
ERYTHROCYTE [DISTWIDTH] IN BLOOD BY AUTOMATED COUNT: 13.2 % (ref 11.9–14.6)
HCT VFR BLD AUTO: 45.5 % (ref 35.8–46.3)
HGB BLD-MCNC: 14.5 G/DL (ref 11.7–15.4)
IMM GRANULOCYTES # BLD AUTO: 0 K/UL (ref 0–0.5)
IMM GRANULOCYTES NFR BLD AUTO: 0 % (ref 0–5)
LYMPHOCYTES # BLD: 2.9 K/UL (ref 0.5–4.6)
LYMPHOCYTES NFR BLD: 27 % (ref 13–44)
MCH RBC QN AUTO: 31.3 PG (ref 26.1–32.9)
MCHC RBC AUTO-ENTMCNC: 31.9 G/DL (ref 31.4–35)
MCV RBC AUTO: 98.3 FL (ref 79.6–97.8)
MONOCYTES # BLD: 0.5 K/UL (ref 0.1–1.3)
MONOCYTES NFR BLD: 5 % (ref 4–12)
NEUTS SEG # BLD: 7.3 K/UL (ref 1.7–8.2)
NEUTS SEG NFR BLD: 66 % (ref 43–78)
NRBC # BLD: 0 K/UL (ref 0–0.2)
PLATELET # BLD AUTO: 336 K/UL (ref 150–450)
PMV BLD AUTO: 10.7 FL (ref 9.4–12.3)
RBC # BLD AUTO: 4.63 M/UL (ref 4.05–5.2)
WBC # BLD AUTO: 10.8 K/UL (ref 4.3–11.1)

## 2022-08-17 PROCEDURE — 99214 OFFICE O/P EST MOD 30 MIN: CPT | Performed by: FAMILY MEDICINE

## 2022-08-17 PROCEDURE — 99395 PREV VISIT EST AGE 18-39: CPT | Performed by: FAMILY MEDICINE

## 2022-08-17 RX ORDER — DEXTROAMPHETAMINE SACCHARATE, AMPHETAMINE ASPARTATE MONOHYDRATE, DEXTROAMPHETAMINE SULFATE AND AMPHETAMINE SULFATE 5; 5; 5; 5 MG/1; MG/1; MG/1; MG/1
20 CAPSULE, EXTENDED RELEASE ORAL 2 TIMES DAILY
Qty: 60 CAPSULE | Refills: 0 | Status: SHIPPED | OUTPATIENT
Start: 2022-08-17 | End: 2022-09-16

## 2022-08-17 RX ORDER — DEXTROAMPHETAMINE SACCHARATE, AMPHETAMINE ASPARTATE MONOHYDRATE, DEXTROAMPHETAMINE SULFATE AND AMPHETAMINE SULFATE 5; 5; 5; 5 MG/1; MG/1; MG/1; MG/1
20 CAPSULE, EXTENDED RELEASE ORAL 2 TIMES DAILY
Qty: 60 CAPSULE | Refills: 0 | Status: SHIPPED | OUTPATIENT
Start: 2022-10-15 | End: 2023-10-16

## 2022-08-17 RX ORDER — DEXTROAMPHETAMINE SACCHARATE, AMPHETAMINE ASPARTATE MONOHYDRATE, DEXTROAMPHETAMINE SULFATE AND AMPHETAMINE SULFATE 5; 5; 5; 5 MG/1; MG/1; MG/1; MG/1
20 CAPSULE, EXTENDED RELEASE ORAL 2 TIMES DAILY
Qty: 60 CAPSULE | Refills: 0 | Status: SHIPPED | OUTPATIENT
Start: 2022-09-15 | End: 2023-09-16

## 2022-08-17 ASSESSMENT — ANXIETY QUESTIONNAIRES
3. WORRYING TOO MUCH ABOUT DIFFERENT THINGS: 0
1. FEELING NERVOUS, ANXIOUS, OR ON EDGE: 0
7. FEELING AFRAID AS IF SOMETHING AWFUL MIGHT HAPPEN: 0
GAD7 TOTAL SCORE: 0
5. BEING SO RESTLESS THAT IT IS HARD TO SIT STILL: 0
IF YOU CHECKED OFF ANY PROBLEMS ON THIS QUESTIONNAIRE, HOW DIFFICULT HAVE THESE PROBLEMS MADE IT FOR YOU TO DO YOUR WORK, TAKE CARE OF THINGS AT HOME, OR GET ALONG WITH OTHER PEOPLE: NOT DIFFICULT AT ALL
4. TROUBLE RELAXING: 0
6. BECOMING EASILY ANNOYED OR IRRITABLE: 0
2. NOT BEING ABLE TO STOP OR CONTROL WORRYING: 0

## 2022-08-17 ASSESSMENT — PATIENT HEALTH QUESTIONNAIRE - PHQ9
SUM OF ALL RESPONSES TO PHQ QUESTIONS 1-9: 0
SUM OF ALL RESPONSES TO PHQ QUESTIONS 1-9: 0
2. FEELING DOWN, DEPRESSED OR HOPELESS: 0
SUM OF ALL RESPONSES TO PHQ9 QUESTIONS 1 & 2: 0
SUM OF ALL RESPONSES TO PHQ QUESTIONS 1-9: 0
1. LITTLE INTEREST OR PLEASURE IN DOING THINGS: 0
SUM OF ALL RESPONSES TO PHQ QUESTIONS 1-9: 0

## 2022-08-17 ASSESSMENT — ENCOUNTER SYMPTOMS
BACK PAIN: 1
SHORTNESS OF BREATH: 0
ABDOMINAL PAIN: 0
CONSTIPATION: 0
BLOOD IN STOOL: 0
DIARRHEA: 0
COLOR CHANGE: 0

## 2022-08-17 NOTE — ASSESSMENT & PLAN NOTE
Problem and/or Symptoms are currently stable and/or improving on current treatment plan. Will continue and have patient follow up as directed.     Med RF X 3 M

## 2022-08-17 NOTE — PROGRESS NOTES
Jhonny  [address] [place] [address]  Phone: [phone]  Fax: [phone]  [email or web address]      Encounter [id number] Anand; Established patient 32 y. o.female; seen 8/17/2022 for: Annual Exam and Other ([facility] Paper work)      Assessment & Plan    1. Annual physical exam  Assessment & Plan:  Discussed ideal body weight and encouraged regular physical activity and healthy diet. Recommended routine preventative measures such as always wearing seatbelt & home safety measures. Counseled the patient regarding the rationale for the recommended immunizations and screenings and they are current and/or updated. 2. Attention deficit hyperactivity disorder (ADHD), predominantly inattentive type  Assessment & Plan:  Problem and/or Symptoms are currently stable and/or improving on current treatment plan. Will continue and have patient follow up as directed. Med RF X 3 M  Orders:  -     amphetamine-dextroamphetamine (ADDERALL XR) 20 MG extended release capsule; Take 1 capsule by mouth 2 times daily for 30 days. , Disp-60 capsule, R-0Normal  -     amphetamine-dextroamphetamine (ADDERALL XR) 20 MG extended release capsule; Take 1 capsule by mouth in the morning and at bedtime. , Disp-60 capsule, R-0Normal  -     amphetamine-dextroamphetamine (ADDERALL XR) 20 MG extended release capsule; Take 1 capsule by mouth in the morning and at bedtime. , Disp-60 capsule, R-0Normal  3. Increased MCV  Comments:  New Problem: ordering CBC & Vit B12 today  Orders:  -     CBC with Auto Differential; Future  -     Vitamin B12; Future  4. Facet arthropathy, lumbar  Assessment & Plan:  Problem and/or Symptoms are currently stable and/or improving on current treatment plan. Will continue and have patient follow up as directed. [facility] paperwork completed for pt X 1 Y      Check Out Instructions  Return in about 3 months (around 11/17/2022) for Virtual Visit.       Subjective & Objective    HPI  Pt here for CPX & f/u ADHD for med RF; also needs a repeat CBC for an abnormal one at her last visit. Also needs FMLA paperwork completed again with new dates of: 8/17/22 to 8/16/23. Review of Systems   Constitutional:  Negative for fatigue and unexpected weight change. Eyes:  Negative for visual disturbance. Respiratory:  Negative for shortness of breath. Cardiovascular:  Negative for chest pain. Gastrointestinal:  Negative for abdominal pain, blood in stool, constipation and diarrhea. Genitourinary:  Negative for difficulty urinating, hematuria and menstrual problem. Musculoskeletal:  Positive for arthralgias and back pain. Negative for myalgias. Skin:  Negative for color change. Neurological:  Negative for weakness and headaches. Psychiatric/Behavioral:  Negative for dysphoric mood. The patient is nervous/anxious. Physical Exam  Vitals and nursing note reviewed. Constitutional:       Appearance: Normal appearance. HENT:      Head: Normocephalic and atraumatic. Neck:      Trachea: Phonation normal.   Cardiovascular:      Rate and Rhythm: Normal rate and regular rhythm. Pulses: Normal pulses. Heart sounds: Normal heart sounds. Pulmonary:      Effort: Pulmonary effort is normal. No respiratory distress. Breath sounds: Normal breath sounds. Musculoskeletal:         General: No deformity. Skin:     Findings: No rash. Neurological:      Mental Status: She is alert. Mental status is at baseline.    Psychiatric:         Mood and Affect: Mood normal.         Behavior: Behavior normal.     Vitals:    08/17/22 1541 08/17/22 1543   BP: (!) 134/97 129/89   Site: Left Upper Arm Right Upper Arm   Position: Sitting Sitting   Cuff Size: Large Adult Large Adult   Pulse: 93 (!) 103   Temp: 96.9 °F (36.1 °C)    TempSrc: Temporal    SpO2: 99%    Weight: 186 lb (84.4 kg)    Height: 5' 4\" (1.626 m)      BP Readings from Last 3 Encounters:   08/17/22 129/89   09/15/21 139/81 09/08/21 (!) 135/90     Body mass index is 31.93 kg/m². Wt Readings from Last 3 Encounters:   08/17/22 186 lb (84.4 kg)   09/15/21 184 lb (83.5 kg)   09/08/21 186 lb 12.8 oz (84.7 kg)       We discussed the typical prognosis and potential complications of the concern(s), including treatment options. Medication risks, benefits, costs, interactions, and alternatives were discussed as appropriate. I advised her to contact the office if her condition worsens or fails to improve as anticipated. She expressed understanding with the discussion and plan of care. An electronic signature was used to authenticate this note. -- Kallie Pacheco MD       Health Maintenance    Vaccinations (most recent date)  Influenza (Yearly): na  Tetanus (Q10 Years): 2021  Shingles (Age 52+): na  Pneumovax (Age 65+): na    Cancer Screening (most recent date)  Colon (Age 39-70): na  Breast (Age 39-70 Q1 Years): na  Cervical (Age 21-29 Pap Q3 Years): 2021  Cervical (Age 33-67 HPV Q5 Years): na    Disease Screening (most recent date)  Cholesterol (Age 21-72 Q8 Years): June 2022  Diabetes (Age 38-68 Q2 Years): June 2022  T/A/D Use:  reports that she has never smoked. She has never used smokeless tobacco. She reports that she does not currently use alcohol. She reports that she does not use drugs. STD's (Yearly): declines  Bone Density (Age 65+ if female): na  AAA Screen (Age 73-68 if male & ever smoked): na    No flowsheet data found. Other Providers Seen  Patient Care Team:  Francheska Vera [facility] as PCP [id number] Empaneled Provider    Risk Factors  Risk factors (depression, fall risk, smoking, poor nutrition, physical inactivity, obesity, etc) were identified and appropriate counseling given and/or referrals were made as appropriate.  If no counseling given and/or referrals were made then no risk factors as listed were identified or there is already a treatment plan in place to address said risk factors. The patient's chart was reviewed and updated as appropriate including: problem list, current medications, past medical, surgical, & social history, family history, allergies, & care teams.

## 2022-08-17 NOTE — ASSESSMENT & PLAN NOTE
Problem and/or Symptoms are currently stable and/or improving on current treatment plan. Will continue and have patient follow up as directed.     FMLA paperwork completed for pt X 1 Y

## 2022-08-18 LAB — VIT B12 SERPL-MCNC: 523 PG/ML (ref 193–986)

## 2022-09-16 PROBLEM — Z00.00 ANNUAL PHYSICAL EXAM: Status: RESOLVED | Noted: 2022-08-17 | Resolved: 2022-09-16

## 2022-11-17 ENCOUNTER — TELEMEDICINE (OUTPATIENT)
Dept: FAMILY MEDICINE CLINIC | Facility: CLINIC | Age: 27
End: 2022-11-17
Payer: COMMERCIAL

## 2022-11-17 DIAGNOSIS — F90.0 ATTENTION DEFICIT HYPERACTIVITY DISORDER (ADHD), PREDOMINANTLY INATTENTIVE TYPE: Primary | ICD-10-CM

## 2022-11-17 DIAGNOSIS — E06.3 HASHIMOTO'S THYROIDITIS: ICD-10-CM

## 2022-11-17 DIAGNOSIS — E28.2 PCOS (POLYCYSTIC OVARIAN SYNDROME): ICD-10-CM

## 2022-11-17 DIAGNOSIS — R73.02 IGT (IMPAIRED GLUCOSE TOLERANCE): ICD-10-CM

## 2022-11-17 PROCEDURE — 99214 OFFICE O/P EST MOD 30 MIN: CPT | Performed by: FAMILY MEDICINE

## 2022-11-17 RX ORDER — DEXTROAMPHETAMINE SACCHARATE, AMPHETAMINE ASPARTATE MONOHYDRATE, DEXTROAMPHETAMINE SULFATE AND AMPHETAMINE SULFATE 5; 5; 5; 5 MG/1; MG/1; MG/1; MG/1
20 CAPSULE, EXTENDED RELEASE ORAL 2 TIMES DAILY
Qty: 60 CAPSULE | Refills: 0 | Status: SHIPPED | OUTPATIENT
Start: 2022-12-15 | End: 2023-12-16

## 2022-11-17 RX ORDER — DEXTROAMPHETAMINE SACCHARATE, AMPHETAMINE ASPARTATE MONOHYDRATE, DEXTROAMPHETAMINE SULFATE AND AMPHETAMINE SULFATE 5; 5; 5; 5 MG/1; MG/1; MG/1; MG/1
20 CAPSULE, EXTENDED RELEASE ORAL 2 TIMES DAILY
Qty: 60 CAPSULE | Refills: 0 | Status: SHIPPED | OUTPATIENT
Start: 2023-01-15 | End: 2024-01-16

## 2022-11-17 RX ORDER — DEXTROAMPHETAMINE SACCHARATE, AMPHETAMINE ASPARTATE MONOHYDRATE, DEXTROAMPHETAMINE SULFATE AND AMPHETAMINE SULFATE 5; 5; 5; 5 MG/1; MG/1; MG/1; MG/1
20 CAPSULE, EXTENDED RELEASE ORAL 2 TIMES DAILY
Qty: 60 CAPSULE | Refills: 0 | Status: SHIPPED | OUTPATIENT
Start: 2022-11-17 | End: 2023-11-18

## 2022-11-17 RX ORDER — LEVOTHYROXINE AND LIOTHYRONINE 38; 9 UG/1; UG/1
60 TABLET ORAL DAILY
Qty: 90 TABLET | Refills: 0 | Status: SHIPPED | OUTPATIENT
Start: 2022-11-17

## 2022-11-17 NOTE — PROGRESS NOTES
[address]  Phone: [phone]  Fax: [phone]  [email or web address]      Encounter [id number] Anand; Established patient 32 y. o.female; seen 11/17/2022 for: 3 Month Follow-Up, Medication Refill, ADHD, Diabetes, and Hypothyroidism      Assessment & Plan    1. Attention deficit hyperactivity disorder (ADHD), predominantly inattentive type  -     amphetamine-dextroamphetamine (ADDERALL XR) 20 MG extended release capsule; Take 1 capsule by mouth in the morning and at bedtime. , Disp-60 capsule, R-0Normal  -     amphetamine-dextroamphetamine (ADDERALL XR) 20 MG extended release capsule; Take 1 capsule by mouth in the morning and at bedtime. , Disp-60 capsule, R-0Normal  -     amphetamine-dextroamphetamine (ADDERALL XR) 20 MG extended release capsule; Take 1 capsule by mouth 2 times daily. , Disp-60 capsule, R-0Normal  2. Hashimoto's thyroiditis  -     thyroid (ARMOUR) 60 MG tablet; Take 1 tablet by mouth daily, Disp-90 tablet, R-0Normal  3. PCOS (polycystic ovarian syndrome)  -     Semaglutide-Weight Management (WEGOVY) 0.25 MG/0.5ML SOAJ SC injection; Inject 0.25 mg into the skin every 7 days for 28 days, Disp-2 mL, R-0Normal  -     Semaglutide-Weight Management (WEGOVY) 0.5 MG/0.5ML SOAJ SC injection; Inject 0.5 mg into the skin every 7 days for 28 days, Disp-2 mL, R-0Normal  -     Semaglutide-Weight Management (WEGOVY) 1 MG/0.5ML SOAJ SC injection; Inject 1 mg into the skin every 7 days for 28 days, Disp-2 mL, R-0Normal  -     Semaglutide-Weight Management (WEGOVY) 1.7 MG/0.75ML SOAJ SC injection; Inject 1.7 mg into the skin every 7 days for 28 days, Disp-3 mL, R-0Normal  -     Semaglutide-Weight Management (WEGOVY) 2.4 MG/0.75ML SOAJ SC injection; Inject 2.4 mg into the skin every 7 days, Disp-3 mL, R-2Normal  4. IGT (impaired glucose tolerance)  -     Semaglutide-Weight Management (WEGOVY) 0.25 MG/0.5ML SOAJ SC injection;  Inject 0.25 mg into the skin every 7 days for 28 days, Disp-2 mL, R-0Normal  -     Semaglutide-Weight Management (WEGOVY) 0.5 MG/0.5ML SOAJ SC injection; Inject 0.5 mg into the skin every 7 days for 28 days, Disp-2 mL, R-0Normal  -     Semaglutide-Weight Management (WEGOVY) 1 MG/0.5ML SOAJ SC injection; Inject 1 mg into the skin every 7 days for 28 days, Disp-2 mL, R-0Normal  -     Semaglutide-Weight Management (WEGOVY) 1.7 MG/0.75ML SOAJ SC injection; Inject 1.7 mg into the skin every 7 days for 28 days, Disp-3 mL, R-0Normal  -     Semaglutide-Weight Management (WEGOVY) 2.4 MG/0.75ML SOAJ SC injection; Inject 2.4 mg into the skin every 7 days, Disp-3 mL, R-2Normal  5. BMI 27.0-27.9,adult  -     Semaglutide-Weight Management (WEGOVY) 0.25 MG/0.5ML SOAJ SC injection; Inject 0.25 mg into the skin every 7 days for 28 days, Disp-2 mL, R-0Normal  -     Semaglutide-Weight Management (WEGOVY) 0.5 MG/0.5ML SOAJ SC injection; Inject 0.5 mg into the skin every 7 days for 28 days, Disp-2 mL, R-0Normal  -     Semaglutide-Weight Management (WEGOVY) 1 MG/0.5ML SOAJ SC injection; Inject 1 mg into the skin every 7 days for 28 days, Disp-2 mL, R-0Normal  -     Semaglutide-Weight Management (WEGOVY) 1.7 MG/0.75ML SOAJ SC injection; Inject 1.7 mg into the skin every 7 days for 28 days, Disp-3 mL, R-0Normal  -     Semaglutide-Weight Management (WEGOVY) 2.4 MG/0.75ML SOAJ SC injection; Inject 2.4 mg into the skin every 7 days, Disp-3 mL, R-2Normal    Problem and/or Symptoms are currently stable and/or improving on current treatment plan. Will continue and have patient follow up as directed. Meds RF X 3 M & new script for [facility]LUIS ACKERMAN regimen sent to local pharmacy; advised pt that with the current popularity of this med that many pharmacies are out of stock. Check Out Instructions  Return in about 3 months (around 2/17/2023) for With PCP.       Subjective & Objective    HPI  Patient states that chronic health problems are stable on current regimens and has no acute concerns today except as mentioned. Only concern today is wanting help to lose weight; specifically wants to try something like Wegovy as that could be helpful not just for weight loss but also for her PCOS. Review of Systems     Physical Exam  Patient-Reported Vitals 11/17/2022   Patient-Reported Weight 164   Patient-Reported Height 5'5   Patient-Reported Systolic 267   Patient-Reported Diastolic 75   Patient-Reported Pulse 70       BP Readings from Last 3 Encounters:   08/17/22 129/89   09/15/21 139/81   09/08/21 (!) 135/90     Wt Readings from Last 3 Encounters:   08/17/22 186 lb (84.4 kg)   09/15/21 184 lb (83.5 kg)   09/08/21 186 lb 12.8 oz (84.7 kg)     There is no height or weight on file to calculate BMI. PHQ-9  8/17/2022   Little interest or pleasure in doing things 0   Little interest or pleasure in doing things -   Feeling down, depressed, or hopeless 0   Trouble falling or staying asleep, or sleeping too much -   Feeling tired or having little energy -   Poor appetite, weight loss, or overeating -   Feeling bad about yourself - or that you are a failure or have let yourself or your family down -   Trouble concentrating on things such as school, work, reading, or watching TV -   Moving or speaking so slowly that other people could have noticed; or the opposite being so fidgety that others notice -   Thoughts of being better off dead, or hurting yourself in some way -   PHQ-2 Score 0   Total Score PHQ 2 -   PHQ-9 Total Score 0   PHQ 9 Score -   How difficult have these problems made it for you to do your work, take care of your home and get along with others -        We discussed the typical prognosis and potential complications of the concern(s), including treatment options. Medication risks, benefits, costs, interactions, and alternatives were discussed as appropriate. I advised her to contact the office if her condition worsens or fails to improve as anticipated.  She expressed understanding with the discussion and plan of care. Isaac Lopez, was evaluated through a synchronous (real-time) audio and/or video encounter. The patient (or guardian if applicable) is aware that this is a billable service, which includes applicable co-pays. This Virtual Visit was conducted with patient's (and/or legal guardian's) consent. The visit was conducted pursuant to the emergency declaration under the [address] [place] [address], [address] [place] [address] authority and the [employer] and [employer] General Act. Patient identification was verified, and a caregiver was present when appropriate. The patient was located at Home: [address] [place] [address] [place] [id number]. Provider was located at [place] [address] (Appt Dept): [id number] [place] [address]. An electronic signature was used to authenticate this note.   -- Rocky Dickinson MD

## 2022-11-18 ENCOUNTER — PATIENT MESSAGE (OUTPATIENT)
Dept: FAMILY MEDICINE CLINIC | Facility: CLINIC | Age: 27
End: 2022-11-18

## 2022-11-18 DIAGNOSIS — R73.02 IGT (IMPAIRED GLUCOSE TOLERANCE): Primary | ICD-10-CM

## 2022-11-18 NOTE — TELEPHONE ENCOUNTER
From: Ruby Fields  To: Dr. Britni Thomas: 11/18/2022 11:42 AM EST  Subject: Kisha Bower,    the pharmacy is asking if you are able to send a prescription for ozempic as the insurance did not approved mounjaro.     thank you

## 2022-11-21 ENCOUNTER — TELEPHONE (OUTPATIENT)
Dept: FAMILY MEDICINE CLINIC | Facility: CLINIC | Age: 27
End: 2022-11-21

## 2022-11-21 NOTE — TELEPHONE ENCOUNTER
Please see prior message: the script sent in was for [facility] and the US brands are 1) Ozempic  2) Rybelsus  & 3) Wegovy. I can't prescribe Wegovy as something different bc I already prescribed it. .. I'm not sure where the confusion is coming from but I would advice pt to check with insurance company about what they will or won't cover.

## 2022-11-21 NOTE — TELEPHONE ENCOUNTER
RPOCTAVIANO from [address] [place] [address] has called wanting clarifications on the Metformin. States pt was last on the ER and notice the order sent in was metFORMIN (GLUCOPHAGE) 500 MG tablet. Rph wants to confirm if this change is correct before shipping the medication. Thx!

## 2022-11-22 NOTE — TELEPHONE ENCOUNTER
I didn't change her Metformin, just Rf'd it \"as is\" from what was already on her med list. If she wants XR version that's fine, but the IR version was what was on her chart when I saw her.

## 2022-11-23 NOTE — TELEPHONE ENCOUNTER
I have called the pt and mychart message her to confirm which metformin she wants. Pharmacy called back today left a vm and said pt wants the extended release. I call the pharmacy back and give them verbal to change to the XR, keeping you in the loop.  Thx

## 2022-11-25 NOTE — TELEPHONE ENCOUNTER
I am sending script for Metformin XR as requested; again the first time I RF that med it was on her med list as IR not XR, but if she wants to switch to XR that's fine. I cannot help her with the Semaglutide as I've already sent it for a generic script to [facility] Anand. Pt has requested several variations of this but again, I've already prescribed this. Please contact the pharmacy directly to find out what the issue is, as I don't know how else I can send the prescription other than how I've already sent it? ? If pt continues to have issues with this script then we need to get clarification on what, if any, GLP-1 agonist meds her insurance will cover. We have no way of knowing that from our end and I've already sent her a script for generic Semaglutide which should be good for any US Brand name such as 1) Ozempic  2) Rybelsus  & 3) Wegovy.

## 2022-12-15 ENCOUNTER — TELEMEDICINE (OUTPATIENT)
Dept: FAMILY MEDICINE CLINIC | Facility: CLINIC | Age: 27
End: 2022-12-15
Payer: COMMERCIAL

## 2022-12-15 DIAGNOSIS — F41.9 ANXIETY AND DEPRESSION: Primary | ICD-10-CM

## 2022-12-15 DIAGNOSIS — E28.2 PCOS (POLYCYSTIC OVARIAN SYNDROME): ICD-10-CM

## 2022-12-15 DIAGNOSIS — F33.1 MAJOR DEPRESSIVE DISORDER, RECURRENT, MODERATE (HCC): ICD-10-CM

## 2022-12-15 DIAGNOSIS — F32.A ANXIETY AND DEPRESSION: Primary | ICD-10-CM

## 2022-12-15 DIAGNOSIS — E66.3 OVERWEIGHT (BMI 25.0-29.9): ICD-10-CM

## 2022-12-15 PROCEDURE — 99214 OFFICE O/P EST MOD 30 MIN: CPT | Performed by: FAMILY MEDICINE

## 2022-12-15 RX ORDER — HYDROXYZINE HYDROCHLORIDE 25 MG/1
25 TABLET, FILM COATED ORAL DAILY PRN
Qty: 30 TABLET | Refills: 2 | Status: SHIPPED | OUTPATIENT
Start: 2022-12-15

## 2022-12-15 NOTE — PROGRESS NOTES
Deonte Cazares is a 32 y.o. female who was seen by synchronous (real-time) audio-video technology on 12/15/2022. Subjective:   Deonte Cazares was seen for Other (FMLA)  Pt started a new position at work 1 yr ago. Has been feeling overwhelmed making her anxiety and depression worse. At times feels like she's going to have a panic attack. It's her anxiety that's the main problem  Crying multiple times a day. Sleep is disrupted. She started counseling last week. She would like to use her FMLA/STD for January-march (total 12 weeks)    Feels like she's hit a plateau with her weight loss. Weighs 167 ht 5'5  She is meal prepping. Going to the gym. Is on metformin for her pcos. BMI 27.8  Insurance will cover ozempic        Allergies   Allergen Reactions    Penicillins Swelling     TONGUE SWELLS         Objective:     General: alert, cooperative, and no distress. Slightly overweight   Mental  status: mental status: alert, oriented to person, place, and time, appears overwhelmed   Resp: No distress. Neuro: No acute deficits   Skin: skin: no discoloration or lesions of concern on visible areas     Due to this being a TeleHealth evaluation, many elements of the physical examination are unable to be assessed. Assessment & Plan:   Alexsandra Sanchez was seen today for other. Diagnoses and all orders for this visit:    Anxiety and depression  -     hydrOXYzine HCl (ATARAX) 25 MG tablet; Take 1 tablet by mouth daily as needed for Anxiety    Major depressive disorder, recurrent, moderate (HCC)    PCOS (polycystic ovarian syndrome)  -     Semaglutide,0.25 or 0.5MG/DOS, 2 MG/1.5ML SOPN; 0.25mg subQ once weekly x 4 weeks. Then 0.5mg subq once weekly x 4 weeks    Overweight (BMI 25.0-29.9)  -     Semaglutide,0.25 or 0.5MG/DOS, 2 MG/1.5ML SOPN; 0.25mg subQ once weekly x 4 weeks. Then 0.5mg subq once weekly x 4 weeks    She'll have paperwork sent to the office.  Total of 12 weeks starting 1/1/23  Use atarax prn. Cont counseling  Starting dosages of ozempic sent in. She'll be in for labs in the next couple of months, will f/u on ozempic at that time and send in next dose. MOA of GLP1 discussed and possible s/e's        CPT Codes 05455-39815 for Established Patients may apply to this Telehealth Visit    Tali Jha was evaluated through a synchronous (real-time) audio-video encounter. The patient (or guardian if applicable) is aware that this is a billable service, which includes applicable co-pays. This Virtual Visit was conducted with patient's (and/or leg guardian's) consent. The visit was conducted pursuant to the emergency declaration under the [address] [place] [address], [facility] waiver authority and the [employer] and [employer] General Act. Patient identification was verified, and a caregiver was present when appropriate. The patient was located in a state where the provider was licensed to provide care. I was at home while conducting this encounter. Pt was at home. We discussed the expected course, resolution and complications of the diagnosis(es) in detail. Medication risks, benefits, costs, interactions, and alternatives were discussed as indicated. I advised her to contact the office if her condition worsens, changes or fails to improve as anticipated. She expressed understanding with the diagnosis(es) and plan.      Bo Jones DO

## 2023-01-06 DIAGNOSIS — E66.3 OVERWEIGHT (BMI 25.0-29.9): ICD-10-CM

## 2023-01-06 DIAGNOSIS — E28.2 PCOS (POLYCYSTIC OVARIAN SYNDROME): ICD-10-CM

## 2023-02-02 ENCOUNTER — TELEMEDICINE (OUTPATIENT)
Dept: FAMILY MEDICINE CLINIC | Facility: CLINIC | Age: 28
End: 2023-02-02
Payer: COMMERCIAL

## 2023-02-02 DIAGNOSIS — E66.09 CLASS 1 OBESITY DUE TO EXCESS CALORIES WITH SERIOUS COMORBIDITY AND BODY MASS INDEX (BMI) OF 34.0 TO 34.9 IN ADULT: ICD-10-CM

## 2023-02-02 DIAGNOSIS — E06.3 HASHIMOTO'S THYROIDITIS: ICD-10-CM

## 2023-02-02 DIAGNOSIS — F90.0 ATTENTION DEFICIT HYPERACTIVITY DISORDER (ADHD), PREDOMINANTLY INATTENTIVE TYPE: ICD-10-CM

## 2023-02-02 DIAGNOSIS — R73.02 IGT (IMPAIRED GLUCOSE TOLERANCE): Primary | ICD-10-CM

## 2023-02-02 PROCEDURE — 99443 PR PHYS/QHP TELEPHONE EVALUATION 21-30 MIN: CPT | Performed by: FAMILY MEDICINE

## 2023-02-02 RX ORDER — DEXTROAMPHETAMINE SACCHARATE, AMPHETAMINE ASPARTATE MONOHYDRATE, DEXTROAMPHETAMINE SULFATE AND AMPHETAMINE SULFATE 5; 5; 5; 5 MG/1; MG/1; MG/1; MG/1
20 CAPSULE, EXTENDED RELEASE ORAL 2 TIMES DAILY
Qty: 60 CAPSULE | Refills: 0 | Status: SHIPPED | OUTPATIENT
Start: 2023-02-05 | End: 2023-03-07

## 2023-02-02 RX ORDER — DEXTROAMPHETAMINE SACCHARATE, AMPHETAMINE ASPARTATE MONOHYDRATE, DEXTROAMPHETAMINE SULFATE AND AMPHETAMINE SULFATE 5; 5; 5; 5 MG/1; MG/1; MG/1; MG/1
20 CAPSULE, EXTENDED RELEASE ORAL 2 TIMES DAILY
Qty: 60 CAPSULE | Refills: 0 | Status: SHIPPED | OUTPATIENT
Start: 2023-04-06 | End: 2023-05-06

## 2023-02-02 RX ORDER — DEXTROAMPHETAMINE SACCHARATE, AMPHETAMINE ASPARTATE MONOHYDRATE, DEXTROAMPHETAMINE SULFATE AND AMPHETAMINE SULFATE 5; 5; 5; 5 MG/1; MG/1; MG/1; MG/1
20 CAPSULE, EXTENDED RELEASE ORAL 2 TIMES DAILY
Qty: 60 CAPSULE | Refills: 0 | Status: SHIPPED | OUTPATIENT
Start: 2023-03-07 | End: 2023-04-06

## 2023-02-02 SDOH — ECONOMIC STABILITY: FOOD INSECURITY: WITHIN THE PAST 12 MONTHS, YOU WORRIED THAT YOUR FOOD WOULD RUN OUT BEFORE YOU GOT MONEY TO BUY MORE.: NEVER TRUE

## 2023-02-02 SDOH — ECONOMIC STABILITY: FOOD INSECURITY: WITHIN THE PAST 12 MONTHS, THE FOOD YOU BOUGHT JUST DIDN'T LAST AND YOU DIDN'T HAVE MONEY TO GET MORE.: NEVER TRUE

## 2023-02-02 SDOH — ECONOMIC STABILITY: HOUSING INSECURITY
IN THE LAST 12 MONTHS, WAS THERE A TIME WHEN YOU DID NOT HAVE A STEADY PLACE TO SLEEP OR SLEPT IN A SHELTER (INCLUDING NOW)?: NO

## 2023-02-02 SDOH — ECONOMIC STABILITY: INCOME INSECURITY: HOW HARD IS IT FOR YOU TO PAY FOR THE VERY BASICS LIKE FOOD, HOUSING, MEDICAL CARE, AND HEATING?: NOT HARD AT ALL

## 2023-02-02 ASSESSMENT — PATIENT HEALTH QUESTIONNAIRE - PHQ9
SUM OF ALL RESPONSES TO PHQ9 QUESTIONS 1 & 2: 0
8. MOVING OR SPEAKING SO SLOWLY THAT OTHER PEOPLE COULD HAVE NOTICED. OR THE OPPOSITE, BEING SO FIGETY OR RESTLESS THAT YOU HAVE BEEN MOVING AROUND A LOT MORE THAN USUAL: 0
1. LITTLE INTEREST OR PLEASURE IN DOING THINGS: 0
9. THOUGHTS THAT YOU WOULD BE BETTER OFF DEAD, OR OF HURTING YOURSELF: 0
2. FEELING DOWN, DEPRESSED OR HOPELESS: 0
5. POOR APPETITE OR OVEREATING: 0
SUM OF ALL RESPONSES TO PHQ QUESTIONS 1-9: 0
SUM OF ALL RESPONSES TO PHQ QUESTIONS 1-9: 0
4. FEELING TIRED OR HAVING LITTLE ENERGY: 0
SUM OF ALL RESPONSES TO PHQ QUESTIONS 1-9: 0
3. TROUBLE FALLING OR STAYING ASLEEP: 0
SUM OF ALL RESPONSES TO PHQ QUESTIONS 1-9: 0
10. IF YOU CHECKED OFF ANY PROBLEMS, HOW DIFFICULT HAVE THESE PROBLEMS MADE IT FOR YOU TO DO YOUR WORK, TAKE CARE OF THINGS AT HOME, OR GET ALONG WITH OTHER PEOPLE: 0
6. FEELING BAD ABOUT YOURSELF - OR THAT YOU ARE A FAILURE OR HAVE LET YOURSELF OR YOUR FAMILY DOWN: 0
7. TROUBLE CONCENTRATING ON THINGS, SUCH AS READING THE NEWSPAPER OR WATCHING TELEVISION: 0

## 2023-02-09 ENCOUNTER — NURSE ONLY (OUTPATIENT)
Dept: FAMILY MEDICINE CLINIC | Facility: CLINIC | Age: 28
End: 2023-02-09

## 2023-02-09 DIAGNOSIS — R73.02 IGT (IMPAIRED GLUCOSE TOLERANCE): ICD-10-CM

## 2023-02-09 DIAGNOSIS — E06.3 HASHIMOTO'S THYROIDITIS: ICD-10-CM

## 2023-02-10 LAB
ALBUMIN SERPL-MCNC: 3.8 G/DL (ref 3.5–5)
ALBUMIN/GLOB SERPL: 1.2 (ref 0.4–1.6)
ALP SERPL-CCNC: 84 U/L (ref 50–136)
ALT SERPL-CCNC: 16 U/L (ref 12–65)
ANION GAP SERPL CALC-SCNC: 11 MMOL/L (ref 2–11)
AST SERPL-CCNC: 12 U/L (ref 15–37)
BILIRUB SERPL-MCNC: 0.6 MG/DL (ref 0.2–1.1)
BUN SERPL-MCNC: 10 MG/DL (ref 6–23)
CALCIUM SERPL-MCNC: 8.9 MG/DL (ref 8.3–10.4)
CHLORIDE SERPL-SCNC: 112 MMOL/L (ref 101–110)
CO2 SERPL-SCNC: 19 MMOL/L (ref 21–32)
CREAT SERPL-MCNC: 0.8 MG/DL (ref 0.6–1)
EST. AVERAGE GLUCOSE BLD GHB EST-MCNC: 94 MG/DL
GLOBULIN SER CALC-MCNC: 3.3 G/DL (ref 2.8–4.5)
GLUCOSE SERPL-MCNC: 88 MG/DL (ref 65–100)
HBA1C MFR BLD: 4.9 % (ref 4.8–5.6)
POTASSIUM SERPL-SCNC: 4.2 MMOL/L (ref 3.5–5.1)
PROT SERPL-MCNC: 7.1 G/DL (ref 6.3–8.2)
SODIUM SERPL-SCNC: 142 MMOL/L (ref 133–143)
T4 FREE SERPL-MCNC: 0.9 NG/DL (ref 0.78–1.46)
TSH, 3RD GENERATION: 3.26 UIU/ML (ref 0.36–3.74)

## 2023-02-23 ENCOUNTER — TELEMEDICINE (OUTPATIENT)
Dept: FAMILY MEDICINE CLINIC | Facility: CLINIC | Age: 28
End: 2023-02-23
Payer: COMMERCIAL

## 2023-02-23 DIAGNOSIS — E06.3 HASHIMOTO'S THYROIDITIS: Primary | ICD-10-CM

## 2023-02-23 DIAGNOSIS — I10 PRIMARY HYPERTENSION: ICD-10-CM

## 2023-02-23 DIAGNOSIS — R73.02 IGT (IMPAIRED GLUCOSE TOLERANCE): ICD-10-CM

## 2023-02-23 DIAGNOSIS — E66.09 CLASS 1 OBESITY DUE TO EXCESS CALORIES WITH SERIOUS COMORBIDITY AND BODY MASS INDEX (BMI) OF 34.0 TO 34.9 IN ADULT: ICD-10-CM

## 2023-02-23 PROCEDURE — 99214 OFFICE O/P EST MOD 30 MIN: CPT | Performed by: FAMILY MEDICINE

## 2023-02-23 RX ORDER — AMLODIPINE BESYLATE 10 MG/1
10 TABLET ORAL DAILY
Qty: 90 TABLET | Refills: 1 | Status: SHIPPED | OUTPATIENT
Start: 2023-02-23

## 2023-02-23 RX ORDER — LEVOTHYROXINE AND LIOTHYRONINE 38; 9 UG/1; UG/1
60 TABLET ORAL DAILY
Qty: 90 TABLET | Refills: 1 | Status: SHIPPED | OUTPATIENT
Start: 2023-02-23

## 2023-02-23 NOTE — PROGRESS NOTES
Radha Alvarez is a 32 y.o. female who was seen by synchronous (real-time) audio-video technology on 2/23/2023. Subjective:   Radha Alvarez was seen for Follow-up  Chronic f/u labs done prior and reviewed  Hypothyroid: TFTs wnl. [place] 60  HTN: norvasc 10. Home BP wnl    Started on GLP1 in Dec  Starting weight 167 -> down to 160 today   No side effects to the med. This will be the 3rd week on 1mg. Allergies   Allergen Reactions    Penicillins Swelling     TONGUE SWELLS         Objective:     General: alert, cooperative, no distress, and mildly obese   Mental  status: mental status: alert, oriented to person, place, and time, normal mood, behavior, speech, dress, motor activity, and thought processes   Resp: No distress. Neuro: No acute deficits   Skin: skin: no discoloration or lesions of concern on visible areas     Due to this being a TeleHealth evaluation, many elements of the physical examination are unable to be assessed. Assessment & Plan:   Song Navarrete was seen today for follow-up. Diagnoses and all orders for this visit:    Hashimoto's thyroiditis  -     thyroid (ARMOUR) 60 MG tablet; Take 1 tablet by mouth daily    IGT (impaired glucose tolerance)  -     Semaglutide, 2 MG/DOSE, 8 MG/3ML SOPN; Inject 2 mg into the skin every 7 days    Class 1 obesity due to excess calories with serious comorbidity and body mass index (BMI) of 34.0 to 34.9 in adult  -     Semaglutide, 2 MG/DOSE, 8 MG/3ML SOPN; Inject 2 mg into the skin every 7 days    Primary hypertension  -     amLODIPine (NORVASC) 10 MG tablet; Take 1 tablet by mouth daily      Increasing ozempic to 2mg. If has side effects, let us know and will decrease back to 1mg. Other chronic problems stable. F/u beg of May with new pcp for adderall.  Otherwise chronic f/u in 6 months      CPT Codes 17588-12177 for Established Patients may apply to this Telehealth Visit    Radha Alvarez was evaluated through a synchronous (real-time) audio-video encounter. The patient (or guardian if applicable) is aware that this is a billable service, which includes applicable co-pays. This Virtual Visit was conducted with patient's (and/or leg guardian's) consent. The visit was conducted pursuant to the emergency declaration under the [place] [address] waiver authority and the [employer] and [employer] Act. Patient identification was verified, and a caregiver was present when appropriate. The patient was located in a state where the provider was licensed to provide care. I was at home while conducting this encounter. Pt was at home. We discussed the expected course, resolution and complications of the diagnosis(es) in detail. Medication risks, benefits, costs, interactions, and alternatives were discussed as indicated. I advised her to contact the office if her condition worsens, changes or fails to improve as anticipated. She expressed understanding with the diagnosis(es) and plan.      Bo Jones, DO

## 2023-03-07 DIAGNOSIS — F90.0 ATTENTION DEFICIT HYPERACTIVITY DISORDER (ADHD), PREDOMINANTLY INATTENTIVE TYPE: ICD-10-CM

## 2023-03-07 RX ORDER — DEXTROAMPHETAMINE SACCHARATE, AMPHETAMINE ASPARTATE MONOHYDRATE, DEXTROAMPHETAMINE SULFATE AND AMPHETAMINE SULFATE 5; 5; 5; 5 MG/1; MG/1; MG/1; MG/1
20 CAPSULE, EXTENDED RELEASE ORAL 2 TIMES DAILY
Qty: 60 CAPSULE | Refills: 0 | Status: SHIPPED | OUTPATIENT
Start: 2023-03-07 | End: 2023-04-06

## 2023-03-08 DIAGNOSIS — F90.0 ATTENTION DEFICIT HYPERACTIVITY DISORDER (ADHD), PREDOMINANTLY INATTENTIVE TYPE: ICD-10-CM

## 2023-03-08 RX ORDER — DEXTROAMPHETAMINE SACCHARATE, AMPHETAMINE ASPARTATE, DEXTROAMPHETAMINE SULFATE AND AMPHETAMINE SULFATE 5; 5; 5; 5 MG/1; MG/1; MG/1; MG/1
20 TABLET ORAL 2 TIMES DAILY
Qty: 60 TABLET | Refills: 0 | Status: SHIPPED | OUTPATIENT
Start: 2023-03-08 | End: 2023-04-07

## 2023-03-08 NOTE — TELEPHONE ENCOUNTER
The Adderall immediate release was sent to [facility] yesterday instead of [facility]. Could you resent it to to [facility]?

## 2024-03-14 ENCOUNTER — OFFICE VISIT (OUTPATIENT)
Dept: FAMILY MEDICINE CLINIC | Facility: CLINIC | Age: 29
End: 2024-03-14

## 2024-03-14 VITALS
WEIGHT: 190.2 LBS | BODY MASS INDEX: 31.69 KG/M2 | OXYGEN SATURATION: 98 % | DIASTOLIC BLOOD PRESSURE: 84 MMHG | SYSTOLIC BLOOD PRESSURE: 135 MMHG | TEMPERATURE: 97.5 F | HEIGHT: 65 IN | HEART RATE: 97 BPM

## 2024-03-14 DIAGNOSIS — E66.9 OBESITY (BMI 30.0-34.9): ICD-10-CM

## 2024-03-14 DIAGNOSIS — Z00.00 ANNUAL PHYSICAL EXAM: ICD-10-CM

## 2024-03-14 DIAGNOSIS — M51.26 LUMBAR DISC HERNIATION: ICD-10-CM

## 2024-03-14 DIAGNOSIS — F90.0 ATTENTION DEFICIT HYPERACTIVITY DISORDER (ADHD), PREDOMINANTLY INATTENTIVE TYPE: ICD-10-CM

## 2024-03-14 DIAGNOSIS — R73.02 IGT (IMPAIRED GLUCOSE TOLERANCE): ICD-10-CM

## 2024-03-14 DIAGNOSIS — E78.5 HYPERLIPIDEMIA, UNSPECIFIED HYPERLIPIDEMIA TYPE: ICD-10-CM

## 2024-03-14 DIAGNOSIS — E28.2 PCOS (POLYCYSTIC OVARIAN SYNDROME): ICD-10-CM

## 2024-03-14 DIAGNOSIS — Z98.890 S/P LUMBAR LAMINECTOMY: ICD-10-CM

## 2024-03-14 DIAGNOSIS — G62.9 NEUROPATHY: ICD-10-CM

## 2024-03-14 DIAGNOSIS — M51.37 DDD (DEGENERATIVE DISC DISEASE), LUMBOSACRAL: ICD-10-CM

## 2024-03-14 DIAGNOSIS — G56.01 CARPAL TUNNEL SYNDROME OF RIGHT WRIST: ICD-10-CM

## 2024-03-14 DIAGNOSIS — F33.9 RECURRENT MAJOR DEPRESSIVE DISORDER, REMISSION STATUS UNSPECIFIED (HCC): ICD-10-CM

## 2024-03-14 DIAGNOSIS — I10 HYPERTENSION, UNSPECIFIED TYPE: ICD-10-CM

## 2024-03-14 DIAGNOSIS — E06.3 HASHIMOTO'S THYROIDITIS: ICD-10-CM

## 2024-03-14 DIAGNOSIS — Z00.00 ANNUAL PHYSICAL EXAM: Primary | ICD-10-CM

## 2024-03-14 PROBLEM — D72.829 LEUKOCYTOSIS: Status: RESOLVED | Noted: 2018-07-16 | Resolved: 2024-03-14

## 2024-03-14 PROBLEM — M47.816 FACET ARTHROPATHY, LUMBAR: Status: RESOLVED | Noted: 2018-05-21 | Resolved: 2024-03-14

## 2024-03-14 PROBLEM — M51.379 DDD (DEGENERATIVE DISC DISEASE), LUMBOSACRAL: Status: ACTIVE | Noted: 2024-03-14

## 2024-03-14 PROBLEM — E66.09 CLASS 1 OBESITY DUE TO EXCESS CALORIES WITH SERIOUS COMORBIDITY AND BODY MASS INDEX (BMI) OF 34.0 TO 34.9 IN ADULT: Status: RESOLVED | Noted: 2018-10-03 | Resolved: 2024-03-14

## 2024-03-14 PROBLEM — R00.2 INTERMITTENT PALPITATIONS: Status: ACTIVE | Noted: 2019-04-18

## 2024-03-14 PROBLEM — E66.811 CLASS 1 OBESITY DUE TO EXCESS CALORIES WITH SERIOUS COMORBIDITY AND BODY MASS INDEX (BMI) OF 34.0 TO 34.9 IN ADULT: Status: RESOLVED | Noted: 2018-10-03 | Resolved: 2024-03-14

## 2024-03-14 PROBLEM — F32.9 MDD (MAJOR DEPRESSIVE DISORDER): Status: ACTIVE | Noted: 2022-12-15

## 2024-03-14 PROBLEM — F41.1 GAD (GENERALIZED ANXIETY DISORDER): Status: ACTIVE | Noted: 2018-02-08

## 2024-03-14 PROBLEM — J30.9 AR (ALLERGIC RHINITIS): Status: ACTIVE | Noted: 2018-04-30

## 2024-03-14 PROBLEM — Z92.89 HISTORY OF DIAGNOSTIC TESTS: Status: RESOLVED | Noted: 2019-04-29 | Resolved: 2024-03-14

## 2024-03-14 PROBLEM — G47.00 INSOMNIA: Status: ACTIVE | Noted: 2018-02-08

## 2024-03-14 PROBLEM — F90.9 ADHD (ATTENTION DEFICIT HYPERACTIVITY DISORDER): Status: ACTIVE | Noted: 2018-02-08

## 2024-03-14 PROBLEM — E66.811 OBESITY (BMI 30.0-34.9): Status: ACTIVE | Noted: 2024-03-14

## 2024-03-14 PROBLEM — G56.00 CTS (CARPAL TUNNEL SYNDROME): Status: ACTIVE | Noted: 2024-03-14

## 2024-03-14 LAB
ALBUMIN SERPL-MCNC: 4 G/DL (ref 3.5–5)
ALBUMIN/GLOB SERPL: 1 (ref 0.4–1.6)
ALP SERPL-CCNC: 161 U/L (ref 50–136)
ALT SERPL-CCNC: 20 U/L (ref 12–65)
ANION GAP SERPL CALC-SCNC: 6 MMOL/L (ref 2–11)
AST SERPL-CCNC: 17 U/L (ref 15–37)
BASOPHILS # BLD: 0.1 K/UL (ref 0–0.2)
BASOPHILS NFR BLD: 1 % (ref 0–2)
BILIRUB SERPL-MCNC: 0.3 MG/DL (ref 0.2–1.1)
BUN SERPL-MCNC: 15 MG/DL (ref 6–23)
CALCIUM SERPL-MCNC: 10.1 MG/DL (ref 8.3–10.4)
CHLORIDE SERPL-SCNC: 110 MMOL/L (ref 103–113)
CHOLEST SERPL-MCNC: 210 MG/DL
CO2 SERPL-SCNC: 25 MMOL/L (ref 21–32)
CREAT SERPL-MCNC: 0.8 MG/DL (ref 0.6–1)
DIFFERENTIAL METHOD BLD: NORMAL
EOSINOPHIL # BLD: 0.1 K/UL (ref 0–0.8)
EOSINOPHIL NFR BLD: 1 % (ref 0.5–7.8)
ERYTHROCYTE [DISTWIDTH] IN BLOOD BY AUTOMATED COUNT: 14 % (ref 11.9–14.6)
GLOBULIN SER CALC-MCNC: 4.1 G/DL (ref 2.8–4.5)
GLUCOSE SERPL-MCNC: 104 MG/DL (ref 65–100)
HCT VFR BLD AUTO: 46.3 % (ref 35.8–46.3)
HDLC SERPL-MCNC: 36 MG/DL (ref 40–60)
HDLC SERPL: 5.8
HGB BLD-MCNC: 15 G/DL (ref 11.7–15.4)
IMM GRANULOCYTES # BLD AUTO: 0 K/UL (ref 0–0.5)
IMM GRANULOCYTES NFR BLD AUTO: 0 % (ref 0–5)
LDLC SERPL CALC-MCNC: 116.4 MG/DL
LYMPHOCYTES # BLD: 2.6 K/UL (ref 0.5–4.6)
LYMPHOCYTES NFR BLD: 29 % (ref 13–44)
MCH RBC QN AUTO: 29.9 PG (ref 26.1–32.9)
MCHC RBC AUTO-ENTMCNC: 32.4 G/DL (ref 31.4–35)
MCV RBC AUTO: 92.4 FL (ref 82–102)
MONOCYTES # BLD: 0.5 K/UL (ref 0.1–1.3)
MONOCYTES NFR BLD: 6 % (ref 4–12)
NEUTS SEG # BLD: 5.5 K/UL (ref 1.7–8.2)
NEUTS SEG NFR BLD: 63 % (ref 43–78)
NRBC # BLD: 0 K/UL (ref 0–0.2)
PLATELET # BLD AUTO: 321 K/UL (ref 150–450)
PMV BLD AUTO: 11.1 FL (ref 9.4–12.3)
POTASSIUM SERPL-SCNC: 4.6 MMOL/L (ref 3.5–5.1)
PROT SERPL-MCNC: 8.1 G/DL (ref 6.3–8.2)
RBC # BLD AUTO: 5.01 M/UL (ref 4.05–5.2)
SODIUM SERPL-SCNC: 141 MMOL/L (ref 136–146)
T4 FREE SERPL-MCNC: 0.8 NG/DL (ref 0.78–1.46)
TRIGL SERPL-MCNC: 288 MG/DL (ref 35–150)
TSH, 3RD GENERATION: 3.44 UIU/ML (ref 0.36–3.74)
VLDLC SERPL CALC-MCNC: 57.6 MG/DL (ref 6–23)
WBC # BLD AUTO: 8.8 K/UL (ref 4.3–11.1)

## 2024-03-14 RX ORDER — SEMAGLUTIDE 0.68 MG/ML
INJECTION, SOLUTION SUBCUTANEOUS
Qty: 3 ML | Refills: 0 | Status: SHIPPED | OUTPATIENT
Start: 2024-03-14 | End: 2024-04-18

## 2024-03-14 RX ORDER — DEXTROAMPHETAMINE SACCHARATE, AMPHETAMINE ASPARTATE MONOHYDRATE, DEXTROAMPHETAMINE SULFATE AND AMPHETAMINE SULFATE 5; 5; 5; 5 MG/1; MG/1; MG/1; MG/1
20 CAPSULE, EXTENDED RELEASE ORAL EVERY MORNING
Qty: 30 CAPSULE | Refills: 0 | Status: SHIPPED | OUTPATIENT
Start: 2024-05-10 | End: 2024-06-09

## 2024-03-14 RX ORDER — MELOXICAM 15 MG/1
15 TABLET ORAL DAILY PRN
Qty: 30 TABLET | Refills: 2 | Status: SHIPPED | OUTPATIENT
Start: 2024-03-14

## 2024-03-14 RX ORDER — DEXTROAMPHETAMINE SACCHARATE, AMPHETAMINE ASPARTATE MONOHYDRATE, DEXTROAMPHETAMINE SULFATE AND AMPHETAMINE SULFATE 5; 5; 5; 5 MG/1; MG/1; MG/1; MG/1
20 CAPSULE, EXTENDED RELEASE ORAL EVERY MORNING
Qty: 30 CAPSULE | Refills: 0 | Status: SHIPPED | OUTPATIENT
Start: 2024-03-14 | End: 2024-04-13

## 2024-03-14 RX ORDER — DEXTROAMPHETAMINE SACCHARATE, AMPHETAMINE ASPARTATE MONOHYDRATE, DEXTROAMPHETAMINE SULFATE AND AMPHETAMINE SULFATE 5; 5; 5; 5 MG/1; MG/1; MG/1; MG/1
20 CAPSULE, EXTENDED RELEASE ORAL EVERY MORNING
Qty: 30 CAPSULE | Refills: 0 | Status: SHIPPED | OUTPATIENT
Start: 2024-04-10 | End: 2024-05-10

## 2024-03-14 RX ORDER — PREGABALIN 50 MG/1
50 CAPSULE ORAL 2 TIMES DAILY
Qty: 200 CAPSULE | Refills: 0 | Status: SHIPPED | OUTPATIENT
Start: 2024-03-14 | End: 2025-03-09

## 2024-03-14 SDOH — ECONOMIC STABILITY: INCOME INSECURITY: HOW HARD IS IT FOR YOU TO PAY FOR THE VERY BASICS LIKE FOOD, HOUSING, MEDICAL CARE, AND HEATING?: NOT HARD AT ALL

## 2024-03-14 SDOH — ECONOMIC STABILITY: FOOD INSECURITY: WITHIN THE PAST 12 MONTHS, THE FOOD YOU BOUGHT JUST DIDN'T LAST AND YOU DIDN'T HAVE MONEY TO GET MORE.: NEVER TRUE

## 2024-03-14 SDOH — ECONOMIC STABILITY: FOOD INSECURITY: WITHIN THE PAST 12 MONTHS, YOU WORRIED THAT YOUR FOOD WOULD RUN OUT BEFORE YOU GOT MONEY TO BUY MORE.: NEVER TRUE

## 2024-03-14 ASSESSMENT — ENCOUNTER SYMPTOMS
COLOR CHANGE: 0
BLOOD IN STOOL: 0
CONSTIPATION: 0
ABDOMINAL PAIN: 0
BACK PAIN: 1
DIARRHEA: 0
SHORTNESS OF BREATH: 0

## 2024-03-14 ASSESSMENT — PATIENT HEALTH QUESTIONNAIRE - PHQ9
SUM OF ALL RESPONSES TO PHQ9 QUESTIONS 1 & 2: 0
4. FEELING TIRED OR HAVING LITTLE ENERGY: 0
2. FEELING DOWN, DEPRESSED OR HOPELESS: 0
1. LITTLE INTEREST OR PLEASURE IN DOING THINGS: 0
SUM OF ALL RESPONSES TO PHQ QUESTIONS 1-9: 0
8. MOVING OR SPEAKING SO SLOWLY THAT OTHER PEOPLE COULD HAVE NOTICED. OR THE OPPOSITE, BEING SO FIGETY OR RESTLESS THAT YOU HAVE BEEN MOVING AROUND A LOT MORE THAN USUAL: 0
5. POOR APPETITE OR OVEREATING: 0
10. IF YOU CHECKED OFF ANY PROBLEMS, HOW DIFFICULT HAVE THESE PROBLEMS MADE IT FOR YOU TO DO YOUR WORK, TAKE CARE OF THINGS AT HOME, OR GET ALONG WITH OTHER PEOPLE: 0
7. TROUBLE CONCENTRATING ON THINGS, SUCH AS READING THE NEWSPAPER OR WATCHING TELEVISION: 0
SUM OF ALL RESPONSES TO PHQ QUESTIONS 1-9: 0
SUM OF ALL RESPONSES TO PHQ QUESTIONS 1-9: 0
3. TROUBLE FALLING OR STAYING ASLEEP: 0
SUM OF ALL RESPONSES TO PHQ QUESTIONS 1-9: 0
6. FEELING BAD ABOUT YOURSELF - OR THAT YOU ARE A FAILURE OR HAVE LET YOURSELF OR YOUR FAMILY DOWN: 0
9. THOUGHTS THAT YOU WOULD BE BETTER OFF DEAD, OR OF HURTING YOURSELF: 0

## 2024-03-14 NOTE — ASSESSMENT & PLAN NOTE
Problem and/or Symptoms are currently not stable and/or well controlled on current treatment plan. Will have patient follow up as directed and make the following changes for further evaluation and/or treatment:     Same plan as for IGT

## 2024-03-14 NOTE — ASSESSMENT & PLAN NOTE
Problem and/or Symptoms are currently not stable and/or well controlled on current treatment plan. Will have patient follow up as directed and make the following changes for further evaluation and/or treatment:     Starting Adderall XR 20 mg QAM X 3 M; pt was on this years ago & it worked well for her

## 2024-03-14 NOTE — ASSESSMENT & PLAN NOTE
Problem and/or Symptoms are new to provider. Will have patient follow up as directed and make the following plan for further evaluation and/or treatment:    Checking pertinent labs today; pt off any thyroid hormone for about a year

## 2024-03-14 NOTE — ASSESSMENT & PLAN NOTE
Problem and/or Symptoms are currently not stable and/or well controlled on current treatment plan. Will have patient follow up as directed and make the following changes for further evaluation and/or treatment:     Advised pt to use straight wrist splint & will provide her with once a day NSAID for the next several weeks, then PRN

## 2024-03-14 NOTE — ASSESSMENT & PLAN NOTE
Problem and/or Symptoms are new to provider. Will have patient follow up as directed and make the following plan for further evaluation and/or treatment:    Pertinent labs pending; also starting pt back on Ozempic for this as well as her PCOS & for weight loss goals

## 2024-03-14 NOTE — PATIENT INSTRUCTIONS
I am checking one or more labs today related to your health concerns and if any results require a change in your treatment regimen you will be notified right away.    - Continue taking all the medications on this list as directed; this list is current and please discontinue any medications not on this list. Please call the office or use \"My Chart\" online to request medication refills prior to running out.    - Please know that we keep Urgent Care visit slots in reserve every day for any acute illness or injury. If you ever have an urgent issue please call our office and we should typically be able to see you within 24 hours, but most often you will be able to be seen the same day that you call.     We also offer Virtual Visits that can be done over a video connection, or regular phone call if you don't have a video connection, if that is your preference vs an office visit.     Finally, please make sure you are scheduling your visits with someone who works at our office, [facility], and not scheduling with our \"call center\". When you get the phone tree options, press \"Option 2\" first & then \"Option 1\". This combination should take you directly to someone at our office. Please try to avoid scheduling any visits through our call center if at all possible.

## 2024-03-14 NOTE — PROGRESS NOTES
reports that she does not currently use alcohol. She reports that she does not use drugs.   STD's (Yearly): declines  Bone Density (Age 65+ if female): na  AAA Screen (Age 65-75 if male & ever smoked): na    Other Providers Seen  Patient Care Team:  Grey Camacho MD as PCP - General (Family Medicine)  Grey Camacho MD as PCP - Empaneled Provider    Risk Factors  Risk factors (depression, fall risk, smoking, poor nutrition, physical inactivity, obesity, etc) were identified and appropriate counseling given and/or referrals were made as appropriate. If no counseling given and/or referrals were made then no risk factors as listed were identified or there is already a treatment plan in place to address said risk factors.     The patient's chart was reviewed and updated as appropriate including: problem list, current medications, past medical, surgical, & social history, family history, allergies, & care teams.

## 2024-03-14 NOTE — ASSESSMENT & PLAN NOTE
Problem and/or Symptoms are new to provider. Will have patient follow up as directed and make the following plan for further evaluation and/or treatment:    Stable: no active Tx needed at this time

## 2024-03-14 NOTE — ASSESSMENT & PLAN NOTE
Problem and/or Symptoms are new to provider. Will have patient follow up as directed and make the following plan for further evaluation and/or treatment:    Not Stable: worse lately after weight gain from pregnancy; will provide pt with Lyrica 50 mg BID; pt used this years ago for this problem & it worked well for her with no ASE's.

## 2024-03-14 NOTE — ASSESSMENT & PLAN NOTE
Problem and/or Symptoms are new to provider. Will have patient follow up as directed and make the following plan for further evaluation and/or treatment:    Pertinent labs pending

## 2024-03-14 NOTE — ASSESSMENT & PLAN NOTE
Problem and/or Symptoms are currently not stable and/or well controlled on current treatment plan. Will have patient follow up as directed and make the following changes for further evaluation and/or treatment:     Pt planning to f/u with her spine specialist who did her last lumbar surgery regarding this; form completed for pt today requesting accomodation for this issue as well. Mobic that was prescribed for her CTS may also be helpful for this

## 2024-03-15 LAB
EST. AVERAGE GLUCOSE BLD GHB EST-MCNC: 105 MG/DL
HBA1C MFR BLD: 5.3 % (ref 4.8–5.6)

## 2024-03-18 ENCOUNTER — PATIENT MESSAGE (OUTPATIENT)
Dept: FAMILY MEDICINE CLINIC | Facility: CLINIC | Age: 29
End: 2024-03-18

## 2024-03-18 DIAGNOSIS — F90.0 ATTENTION DEFICIT HYPERACTIVITY DISORDER (ADHD), PREDOMINANTLY INATTENTIVE TYPE: ICD-10-CM

## 2024-03-19 RX ORDER — DEXTROAMPHETAMINE SACCHARATE, AMPHETAMINE ASPARTATE MONOHYDRATE, DEXTROAMPHETAMINE SULFATE AND AMPHETAMINE SULFATE 5; 5; 5; 5 MG/1; MG/1; MG/1; MG/1
20 CAPSULE, EXTENDED RELEASE ORAL EVERY MORNING
Qty: 30 CAPSULE | Refills: 0 | Status: SHIPPED | OUTPATIENT
Start: 2024-03-19 | End: 2024-06-27

## 2024-03-19 RX ORDER — DEXTROAMPHETAMINE SACCHARATE, AMPHETAMINE ASPARTATE MONOHYDRATE, DEXTROAMPHETAMINE SULFATE AND AMPHETAMINE SULFATE 5; 5; 5; 5 MG/1; MG/1; MG/1; MG/1
20 CAPSULE, EXTENDED RELEASE ORAL EVERY MORNING
Qty: 30 CAPSULE | Refills: 0 | Status: SHIPPED | OUTPATIENT
Start: 2024-05-15 | End: 2024-08-23

## 2024-03-19 RX ORDER — DEXTROAMPHETAMINE SACCHARATE, AMPHETAMINE ASPARTATE MONOHYDRATE, DEXTROAMPHETAMINE SULFATE AND AMPHETAMINE SULFATE 5; 5; 5; 5 MG/1; MG/1; MG/1; MG/1
20 CAPSULE, EXTENDED RELEASE ORAL EVERY MORNING
Qty: 30 CAPSULE | Refills: 0 | Status: SHIPPED | OUTPATIENT
Start: 2024-04-15 | End: 2024-07-24

## 2024-03-19 NOTE — TELEPHONE ENCOUNTER
Medication are pend with new rx      From: Gypsy Layton  To: Dr. Grey Camacho  Sent: 3/18/2024  6:12 PM EDT  Subject: Ozempic    Hi,    My insurance is not covering the medication unless I have a diabetes diagnosis. What do recommend? My plan doesn’t cover weight loss medication but I don’t know if maybe there is anything more generic and less expensive? Or what do you recommend?

## 2024-03-26 ENCOUNTER — TELEPHONE (OUTPATIENT)
Dept: FAMILY MEDICINE CLINIC | Facility: CLINIC | Age: 29
End: 2024-03-26

## 2024-03-26 NOTE — TELEPHONE ENCOUNTER
Per pt: I was wondering what the doctor suggested for as far as rhe weightloss medication, they are not approving ozempic because of the diagnosis so I was wondering if there is any other weightloss medication I can take that would be less expensive.    Please advise tks :)

## 2024-03-27 NOTE — TELEPHONE ENCOUNTER
No there is nothing else she can take right now if her insurance is denying all GLP-1 meds. Since she is already on Adderall she cannot take any appetite suppressants that are stimulants.     If she would like referral to bariatric office or nutrition consult I am happy to place referral, but no other oral medication options are viable if her insurance is denying all GLP-1 options.

## 2024-04-13 PROBLEM — Z00.00 ANNUAL PHYSICAL EXAM: Status: RESOLVED | Noted: 2022-08-17 | Resolved: 2024-04-13

## 2024-06-27 ENCOUNTER — TELEMEDICINE (OUTPATIENT)
Dept: FAMILY MEDICINE CLINIC | Facility: CLINIC | Age: 29
End: 2024-06-27
Payer: COMMERCIAL

## 2024-06-27 DIAGNOSIS — R73.02 IGT (IMPAIRED GLUCOSE TOLERANCE): ICD-10-CM

## 2024-06-27 DIAGNOSIS — Z98.890 S/P LUMBAR LAMINECTOMY: ICD-10-CM

## 2024-06-27 DIAGNOSIS — E78.5 HYPERLIPIDEMIA, UNSPECIFIED HYPERLIPIDEMIA TYPE: ICD-10-CM

## 2024-06-27 DIAGNOSIS — G62.9 NEUROPATHY: ICD-10-CM

## 2024-06-27 DIAGNOSIS — F90.0 ATTENTION DEFICIT HYPERACTIVITY DISORDER (ADHD), PREDOMINANTLY INATTENTIVE TYPE: Primary | ICD-10-CM

## 2024-06-27 DIAGNOSIS — M51.37 DDD (DEGENERATIVE DISC DISEASE), LUMBOSACRAL: ICD-10-CM

## 2024-06-27 DIAGNOSIS — E66.9 OBESITY (BMI 30.0-34.9): ICD-10-CM

## 2024-06-27 DIAGNOSIS — I10 HYPERTENSION, UNSPECIFIED TYPE: ICD-10-CM

## 2024-06-27 DIAGNOSIS — M51.26 LUMBAR DISC HERNIATION: ICD-10-CM

## 2024-06-27 DIAGNOSIS — R05.9 COUGH, UNSPECIFIED TYPE: ICD-10-CM

## 2024-06-27 DIAGNOSIS — E28.2 PCOS (POLYCYSTIC OVARIAN SYNDROME): ICD-10-CM

## 2024-06-27 PROCEDURE — 99214 OFFICE O/P EST MOD 30 MIN: CPT | Performed by: FAMILY MEDICINE

## 2024-06-27 RX ORDER — DEXTROAMPHETAMINE SACCHARATE, AMPHETAMINE ASPARTATE MONOHYDRATE, DEXTROAMPHETAMINE SULFATE AND AMPHETAMINE SULFATE 5; 5; 5; 5 MG/1; MG/1; MG/1; MG/1
20 CAPSULE, EXTENDED RELEASE ORAL EVERY MORNING
Qty: 30 CAPSULE | Refills: 0 | Status: SHIPPED | OUTPATIENT
Start: 2024-08-25 | End: 2024-12-03

## 2024-06-27 RX ORDER — PREDNISONE 10 MG/1
TABLET ORAL
Qty: 21 TABLET | Refills: 0 | Status: SHIPPED | OUTPATIENT
Start: 2024-06-27 | End: 2024-07-07

## 2024-06-27 RX ORDER — CODEINE PHOSPHATE/GUAIFENESIN 10-100MG/5
5 LIQUID (ML) ORAL 4 TIMES DAILY PRN
Qty: 200 ML | Refills: 0 | Status: SHIPPED | OUTPATIENT
Start: 2024-06-27 | End: 2024-07-07

## 2024-06-27 RX ORDER — DEXTROAMPHETAMINE SACCHARATE, AMPHETAMINE ASPARTATE MONOHYDRATE, DEXTROAMPHETAMINE SULFATE AND AMPHETAMINE SULFATE 5; 5; 5; 5 MG/1; MG/1; MG/1; MG/1
20 CAPSULE, EXTENDED RELEASE ORAL EVERY MORNING
Qty: 30 CAPSULE | Refills: 0 | Status: SHIPPED | OUTPATIENT
Start: 2024-07-25 | End: 2024-11-02

## 2024-06-27 RX ORDER — DEXTROAMPHETAMINE SACCHARATE, AMPHETAMINE ASPARTATE MONOHYDRATE, DEXTROAMPHETAMINE SULFATE AND AMPHETAMINE SULFATE 5; 5; 5; 5 MG/1; MG/1; MG/1; MG/1
20 CAPSULE, EXTENDED RELEASE ORAL EVERY MORNING
Qty: 30 CAPSULE | Refills: 0 | Status: SHIPPED | OUTPATIENT
Start: 2024-06-27 | End: 2024-10-05

## 2024-06-27 RX ORDER — PREGABALIN 50 MG/1
50 CAPSULE ORAL 2 TIMES DAILY
Qty: 200 CAPSULE | Refills: 0 | Status: SHIPPED | OUTPATIENT
Start: 2024-06-27 | End: 2024-10-05

## 2024-06-27 NOTE — PROGRESS NOTES
[address] [address]  Phone: [phone]  Fax: [phone]  Email: [email or web address]      Encounter Info  Gypsy Layton; Established patient 29 y.o.female; seen 6/27/2024 for: ADHD and degenerative disc disease      Assessment & Plan    1. Attention deficit hyperactivity disorder (ADHD), predominantly inattentive type  Assessment & Plan:  Problem and/or Symptoms are currently stable and/or improving on current treatment plan. Will continue and have patient follow up as directed.    Meds RF X 3 M  Orders:  -     amphetamine-dextroamphetamine (ADDERALL XR) 20 MG extended release capsule; Take 1 capsule by mouth every morning for 100 days. Max Daily Amount: 20 mg, Disp-30 capsule, R-0Normal  -     amphetamine-dextroamphetamine (ADDERALL XR) 20 MG extended release capsule; Take 1 capsule by mouth every morning for 100 days. Max Daily Amount: 20 mg, Disp-30 capsule, R-0Normal  -     amphetamine-dextroamphetamine (ADDERALL XR) 20 MG extended release capsule; Take 1 capsule by mouth every morning for 100 days. Max Daily Amount: 20 mg, Disp-30 capsule, R-0Normal  2. DDD (degenerative disc disease), lumbosacral  Assessment & Plan:  Problem and/or Symptoms are currently stable and/or improving on current treatment plan. Will continue and have patient follow up as directed.    Meds RF X 3 M. Has f/u scheduled with spine specialist soon  Orders:  -     pregabalin (LYRICA) 50 MG capsule; Take 1 capsule by mouth 2 times daily for 100 days. Max Daily Amount: 100 mg, Disp-200 capsule, R-0Normal  3. Lumbar disc herniation  -     pregabalin (LYRICA) 50 MG capsule; Take 1 capsule by mouth 2 times daily for 100 days. Max Daily Amount: 100 mg, Disp-200 capsule, R-0Normal  4. S/P lumbar laminectomy  -     pregabalin (LYRICA) 50 MG capsule; Take 1 capsule by mouth 2 times daily for 100 days. Max Daily Amount: 100 mg, Disp-200 capsule, R-0Normal  5. Neuropathy  Assessment &

## 2024-06-27 NOTE — ASSESSMENT & PLAN NOTE
Problem and/or Symptoms are currently not stable and/or well controlled on current treatment plan. Will have patient follow up as directed and make the following changes for further evaluation and/or treatment:     Same plan as for BMI > 30

## 2024-06-27 NOTE — ASSESSMENT & PLAN NOTE
Problem and/or Symptoms are currently stable and/or improving on current treatment plan. Will continue and have patient follow up as directed.    Meds RF X 3 M

## 2024-06-27 NOTE — PATIENT INSTRUCTIONS
Continue taking all the medications on this list as directed; this list is current and please discontinue any medications not on this list. Please call the office or use \"My Chart\" online to request medication refills prior to running out.    - PLEASE NOTE: when needing to schedule a visit you should send our office a Flaviar message requesting a visit (or call the office if you can't use Flaviar) and we will send you a scheduling ticket that will include open dates & times. You can then use this feature to select your preferred date & time for an office visit, virtual visit, and/or lab visit. Please make sure to respond to this scheduling ticket ASAP when received.     Please do NOT schedule any visit through the Flaviar self-scheduling feature; any self-scheduling should only be done using the scheduling ticket provided to you directly from our office. Also, please do NOT schedule any visits through our out of state call center. If you ever need to speak to someone at our office directly please follow these instructions: when you call our office number, [phone], and get the phone tree options, press \"Option 2\" first & then \"Option 1\". This combination should take you directly to someone at our office and bypass the out of state call center.

## 2024-06-27 NOTE — ASSESSMENT & PLAN NOTE
Problem and/or Symptoms are currently not stable and/or well controlled on current treatment plan. Will have patient follow up as directed and make the following changes for further evaluation and/or treatment:     New insurance states a preferred option is Mounjaro. Advised pt that we will complete a PA once for a preferred GLP-1 med to help with weight loss; if insurance denies though then nothing more we can do regarding GLP-1 medication, unless pt willing to pay OTP.

## 2024-07-08 ENCOUNTER — TELEPHONE (OUTPATIENT)
Age: 29
End: 2024-07-08

## 2025-03-11 ENCOUNTER — PATIENT MESSAGE (OUTPATIENT)
Dept: FAMILY MEDICINE CLINIC | Facility: CLINIC | Age: 30
End: 2025-03-11

## 2025-03-11 ENCOUNTER — TELEMEDICINE (OUTPATIENT)
Dept: FAMILY MEDICINE CLINIC | Facility: CLINIC | Age: 30
End: 2025-03-11
Payer: COMMERCIAL

## 2025-03-11 DIAGNOSIS — Z98.890 S/P LUMBAR LAMINECTOMY: ICD-10-CM

## 2025-03-11 DIAGNOSIS — E78.5 HYPERLIPIDEMIA, UNSPECIFIED HYPERLIPIDEMIA TYPE: ICD-10-CM

## 2025-03-11 DIAGNOSIS — R73.02 IGT (IMPAIRED GLUCOSE TOLERANCE): ICD-10-CM

## 2025-03-11 DIAGNOSIS — E03.9 HYPOTHYROIDISM, UNSPECIFIED TYPE: ICD-10-CM

## 2025-03-11 DIAGNOSIS — I10 HYPERTENSION, UNSPECIFIED TYPE: ICD-10-CM

## 2025-03-11 DIAGNOSIS — M51.372 DEGENERATION OF INTERVERTEBRAL DISC OF LUMBOSACRAL REGION WITH DISCOGENIC BACK PAIN AND LOWER EXTREMITY PAIN: ICD-10-CM

## 2025-03-11 DIAGNOSIS — F90.0 ATTENTION DEFICIT HYPERACTIVITY DISORDER (ADHD), PREDOMINANTLY INATTENTIVE TYPE: Primary | ICD-10-CM

## 2025-03-11 DIAGNOSIS — E66.811 OBESITY (BMI 30.0-34.9): Primary | ICD-10-CM

## 2025-03-11 DIAGNOSIS — E28.2 PCOS (POLYCYSTIC OVARIAN SYNDROME): ICD-10-CM

## 2025-03-11 DIAGNOSIS — M51.26 LUMBAR DISC HERNIATION: ICD-10-CM

## 2025-03-11 DIAGNOSIS — G62.9 NEUROPATHY: ICD-10-CM

## 2025-03-11 PROCEDURE — 99214 OFFICE O/P EST MOD 30 MIN: CPT | Performed by: FAMILY MEDICINE

## 2025-03-11 RX ORDER — PREGABALIN 50 MG/1
50 CAPSULE ORAL 2 TIMES DAILY
Qty: 200 CAPSULE | Refills: 0 | Status: SHIPPED | OUTPATIENT
Start: 2025-03-11 | End: 2025-06-19

## 2025-03-11 RX ORDER — DEXTROAMPHETAMINE SACCHARATE, AMPHETAMINE ASPARTATE MONOHYDRATE, DEXTROAMPHETAMINE SULFATE AND AMPHETAMINE SULFATE 5; 5; 5; 5 MG/1; MG/1; MG/1; MG/1
20 CAPSULE, EXTENDED RELEASE ORAL EVERY MORNING
Qty: 31 CAPSULE | Refills: 0 | Status: SHIPPED | OUTPATIENT
Start: 2025-03-11 | End: 2025-06-19

## 2025-03-11 RX ORDER — DEXTROAMPHETAMINE SACCHARATE, AMPHETAMINE ASPARTATE MONOHYDRATE, DEXTROAMPHETAMINE SULFATE AND AMPHETAMINE SULFATE 5; 5; 5; 5 MG/1; MG/1; MG/1; MG/1
20 CAPSULE, EXTENDED RELEASE ORAL EVERY MORNING
Qty: 30 CAPSULE | Refills: 0 | Status: SHIPPED | OUTPATIENT
Start: 2025-05-10 | End: 2025-08-18

## 2025-03-11 RX ORDER — LEVOTHYROXINE SODIUM 75 UG/1
75 TABLET ORAL DAILY
COMMUNITY
Start: 2024-12-20

## 2025-03-11 RX ORDER — DEXTROAMPHETAMINE SACCHARATE, AMPHETAMINE ASPARTATE MONOHYDRATE, DEXTROAMPHETAMINE SULFATE AND AMPHETAMINE SULFATE 5; 5; 5; 5 MG/1; MG/1; MG/1; MG/1
20 CAPSULE, EXTENDED RELEASE ORAL EVERY MORNING
Qty: 31 CAPSULE | Refills: 0 | Status: SHIPPED | OUTPATIENT
Start: 2025-04-10 | End: 2025-07-19

## 2025-03-11 SDOH — ECONOMIC STABILITY: FOOD INSECURITY: WITHIN THE PAST 12 MONTHS, THE FOOD YOU BOUGHT JUST DIDN'T LAST AND YOU DIDN'T HAVE MONEY TO GET MORE.: NEVER TRUE

## 2025-03-11 SDOH — ECONOMIC STABILITY: FOOD INSECURITY: WITHIN THE PAST 12 MONTHS, YOU WORRIED THAT YOUR FOOD WOULD RUN OUT BEFORE YOU GOT MONEY TO BUY MORE.: NEVER TRUE

## 2025-03-11 SDOH — ECONOMIC STABILITY: TRANSPORTATION INSECURITY
IN THE PAST 12 MONTHS, HAS LACK OF TRANSPORTATION KEPT YOU FROM MEETINGS, WORK, OR FROM GETTING THINGS NEEDED FOR DAILY LIVING?: NO

## 2025-03-11 SDOH — ECONOMIC STABILITY: INCOME INSECURITY: IN THE LAST 12 MONTHS, WAS THERE A TIME WHEN YOU WERE NOT ABLE TO PAY THE MORTGAGE OR RENT ON TIME?: NO

## 2025-03-11 SDOH — ECONOMIC STABILITY: TRANSPORTATION INSECURITY
IN THE PAST 12 MONTHS, HAS THE LACK OF TRANSPORTATION KEPT YOU FROM MEDICAL APPOINTMENTS OR FROM GETTING MEDICATIONS?: NO

## 2025-03-11 NOTE — PROGRESS NOTES
[place] [address]  Phone: [phone]  Fax: [phone]  Email: [email or web address]      Encounter Info  Gypsy Kandice Layton; Established patient 29 y.o.female; seen 3/11/2025 for: ADHD      Assessment & Plan    1. Attention deficit hyperactivity disorder (ADHD), predominantly inattentive type  -     amphetamine-dextroamphetamine (ADDERALL XR) 20 MG extended release capsule; Take 1 capsule by mouth every morning for 100 days. Max Daily Amount: 20 mg, Disp-31 capsule, R-0Normal  -     amphetamine-dextroamphetamine (ADDERALL XR) 20 MG extended release capsule; Take 1 capsule by mouth every morning for 100 days. Max Daily Amount: 20 mg, Disp-31 capsule, R-0Normal  -     amphetamine-dextroamphetamine (ADDERALL XR) 20 MG extended release capsule; Take 1 capsule by mouth every morning for 100 days. Max Daily Amount: 20 mg, Disp-30 capsule, R-0Normal  2. DDD (degenerative disc disease), lumbosacral  -     pregabalin (LYRICA) 50 MG capsule; Take 1 capsule by mouth 2 times daily for 100 days. Max Daily Amount: 100 mg, Disp-200 capsule, R-0Normal  3. Lumbar disc herniation  -     pregabalin (LYRICA) 50 MG capsule; Take 1 capsule by mouth 2 times daily for 100 days. Max Daily Amount: 100 mg, Disp-200 capsule, R-0Normal  4. S/P lumbar laminectomy  -     pregabalin (LYRICA) 50 MG capsule; Take 1 capsule by mouth 2 times daily for 100 days. Max Daily Amount: 100 mg, Disp-200 capsule, R-0Normal  5. Neuropathy  -     pregabalin (LYRICA) 50 MG capsule; Take 1 capsule by mouth 2 times daily for 100 days. Max Daily Amount: 100 mg, Disp-200 capsule, R-0Normal    Problem and/or Symptoms are currently not stable and/or well controlled on current treatment plan. Will have patient follow up as directed and make the following changes for further evaluation and/or treatment:     Resuming Tx regimen with 3 M of Adderall & Lyrica provided today.       Check Out Instructions  Return in 3

## 2025-03-18 NOTE — TELEPHONE ENCOUNTER
Please confirm & clarify that pt specifically wants a script for MOUNJARO... because the attachment she sent us regarding the insurance coverage does NOT list Mounjaro on that page at all, meaning I have no way to see if this is a covered med or not.   What the picture does include is that WEGOVY is covered (with clinical review). If pt is wanting a new GLP-1 med based on the picture she sent us, then I would recommend Wegovy rather than Mounjaro, just based on the single page she sent us for review.

## 2025-03-26 ENCOUNTER — PATIENT MESSAGE (OUTPATIENT)
Dept: FAMILY MEDICINE CLINIC | Facility: CLINIC | Age: 30
End: 2025-03-26

## 2025-03-26 DIAGNOSIS — E66.811 OBESITY (BMI 30.0-34.9): Primary | ICD-10-CM

## 2025-03-26 DIAGNOSIS — E78.5 HYPERLIPIDEMIA, UNSPECIFIED HYPERLIPIDEMIA TYPE: ICD-10-CM

## 2025-03-26 DIAGNOSIS — I10 HYPERTENSION, UNSPECIFIED TYPE: ICD-10-CM

## 2025-03-26 DIAGNOSIS — R73.02 IGT (IMPAIRED GLUCOSE TOLERANCE): ICD-10-CM

## 2025-03-26 DIAGNOSIS — E28.2 PCOS (POLYCYSTIC OVARIAN SYNDROME): ICD-10-CM

## 2025-03-26 DIAGNOSIS — E03.9 HYPOTHYROIDISM, UNSPECIFIED TYPE: ICD-10-CM

## 2025-03-26 NOTE — TELEPHONE ENCOUNTER
Referral placed to Weight Management specialty clinic; they will take over management of weight loss goals & treatment options now.

## 2025-03-26 NOTE — TELEPHONE ENCOUNTER
Pt ins will not cover glp1 stating that she needs to go to a dietician for 3 months and have documentation that she failed losing weight. Tks :)

## 2025-04-29 ENCOUNTER — OFFICE VISIT (OUTPATIENT)
Age: 30
End: 2025-04-29
Payer: MEDICAID

## 2025-04-29 DIAGNOSIS — M96.1 POSTLAMINECTOMY SYNDROME OF LUMBAR REGION: ICD-10-CM

## 2025-04-29 DIAGNOSIS — M51.362 DEGENERATION OF INTERVERTEBRAL DISC OF LUMBAR REGION WITH DISCOGENIC BACK PAIN AND LOWER EXTREMITY PAIN: ICD-10-CM

## 2025-04-29 DIAGNOSIS — M54.50 LOW BACK PAIN, UNSPECIFIED BACK PAIN LATERALITY, UNSPECIFIED CHRONICITY, UNSPECIFIED WHETHER SCIATICA PRESENT: Primary | ICD-10-CM

## 2025-04-29 PROCEDURE — 99204 OFFICE O/P NEW MOD 45 MIN: CPT | Performed by: PHYSICIAN ASSISTANT

## 2025-04-29 RX ORDER — DICLOFENAC SODIUM 75 MG/1
75 TABLET, DELAYED RELEASE ORAL 2 TIMES DAILY PRN
Qty: 60 TABLET | Refills: 0 | Status: SHIPPED | OUTPATIENT
Start: 2025-04-29

## 2025-04-29 RX ORDER — SERTRALINE HYDROCHLORIDE 25 MG/1
25 TABLET, FILM COATED ORAL DAILY
COMMUNITY
Start: 2025-03-11

## 2025-04-29 RX ORDER — LABETALOL 200 MG/1
200 TABLET, FILM COATED ORAL 2 TIMES DAILY
COMMUNITY
Start: 2025-04-04 | End: 2025-07-03

## 2025-04-29 NOTE — PROGRESS NOTES
Name: Gypsy Layton  YOB: 1995  Gender: female  MRN: [id number]    CC: Back Pain (Low back pain to mid back pain)       History of Present Illness  The patient is a 29-year-old female presenting with persistent back pain.    She has a history of left L5-S1 hemilaminectomy with discectomy in 2019 and revision with left L4 hemilaminectomy in . She recently had a  section in 2025. The pain, described as dull and radiating down her leg, is bilateral but more pronounced on the left side. She is accompanied by a sensation of swelling in her feet, though no physical swelling is observed. Upper back pain is noted, particularly under the bra strap. The pain persists despite clearance for regular activity post- section.    Previous physical therapy, injections, and pain management were ineffective. Her current regimen includes Tylenol, pregabalin, Aleve, Advil, with no relief. She has a high opioid tolerance and previously took high-dose tramadol. She is not breastfeeding. The pain worsens with activity and prolonged standing or walking. She fell during pregnancy due to her legs giving way upon rising from bed.    PAST SURGICAL HISTORY: She has had a left L5-S1 hemilaminectomy with discectomy in , a left L4 hemilaminectomy in , and a  section in 2025.               2025     1:57 PM   AMB PAIN ASSESSMENT   Location of Pain Back    Location Modifiers Posterior;Medial   Severity of Pain 6   Quality of Pain Aching    Duration of Pain Persistent   Frequency of Pain Intermittent   Aggravating Factors Bending;Standing;Walking;Other (Comment)    Limiting Behavior Some   Relieving Factors Nsaids;Other (Comment)    Result of Injury No   Work-Related Injury No   Are there other pain locations you wish to document? No       Significant value              ROS/Meds/PSH/PMH/FH/SH: I personally reviewed the patient's collected intake data.  Below are the

## 2025-05-22 ENCOUNTER — TELEPHONE (OUTPATIENT)
Age: 30
End: 2025-05-22

## 2025-05-22 DIAGNOSIS — M51.362 DEGENERATION OF INTERVERTEBRAL DISC OF LUMBAR REGION WITH DISCOGENIC BACK PAIN AND LOWER EXTREMITY PAIN: ICD-10-CM

## 2025-05-22 DIAGNOSIS — M96.1 POSTLAMINECTOMY SYNDROME OF LUMBAR REGION: Primary | ICD-10-CM

## 2025-05-22 DIAGNOSIS — M54.50 LOW BACK PAIN, UNSPECIFIED BACK PAIN LATERALITY, UNSPECIFIED CHRONICITY, UNSPECIFIED WHETHER SCIATICA PRESENT: ICD-10-CM

## 2025-05-29 ENCOUNTER — TELEPHONE (OUTPATIENT)
Age: 30
End: 2025-05-29

## 2025-05-30 ENCOUNTER — PATIENT MESSAGE (OUTPATIENT)
Age: 30
End: 2025-05-30

## 2025-05-30 DIAGNOSIS — M96.1 POSTLAMINECTOMY SYNDROME OF LUMBAR REGION: Primary | ICD-10-CM

## 2025-05-30 DIAGNOSIS — M51.362 DEGENERATION OF INTERVERTEBRAL DISC OF LUMBAR REGION WITH DISCOGENIC BACK PAIN AND LOWER EXTREMITY PAIN: ICD-10-CM

## 2025-05-30 DIAGNOSIS — M54.50 LOW BACK PAIN, UNSPECIFIED BACK PAIN LATERALITY, UNSPECIFIED CHRONICITY, UNSPECIFIED WHETHER SCIATICA PRESENT: ICD-10-CM

## 2025-05-30 RX ORDER — CELECOXIB 200 MG/1
200 CAPSULE ORAL DAILY
Qty: 30 CAPSULE | Refills: 0 | Status: SHIPPED | OUTPATIENT
Start: 2025-05-30 | End: 2025-06-29

## 2025-05-30 RX ORDER — DICLOFENAC SODIUM 75 MG/1
75 TABLET, DELAYED RELEASE ORAL 2 TIMES DAILY
Qty: 60 TABLET | Refills: 0 | OUTPATIENT
Start: 2025-05-30

## 2025-05-30 NOTE — TELEPHONE ENCOUNTER
Patient reports persistent pain since I last saw her 4/29/2025.  Participates in home core strength exercises without relief.  Diclofenac is not effective.  MRI scan will be ordered for further evaluation.    We will prescribe a different NSAID to see if it would be more beneficial.  Dominique More PA-C

## 2025-06-03 ENCOUNTER — OFFICE VISIT (OUTPATIENT)
Dept: SURGERY | Age: 30
End: 2025-06-03
Payer: MEDICAID

## 2025-06-03 VITALS
HEART RATE: 75 BPM | WEIGHT: 182 LBS | HEIGHT: 64 IN | BODY MASS INDEX: 31.07 KG/M2 | DIASTOLIC BLOOD PRESSURE: 69 MMHG | SYSTOLIC BLOOD PRESSURE: 116 MMHG

## 2025-06-03 DIAGNOSIS — E66.811 CLASS 1 OBESITY DUE TO EXCESS CALORIES WITH SERIOUS COMORBIDITY AND BODY MASS INDEX (BMI) OF 31.0 TO 31.9 IN ADULT: ICD-10-CM

## 2025-06-03 DIAGNOSIS — Z71.3 NUTRITIONAL COUNSELING: ICD-10-CM

## 2025-06-03 DIAGNOSIS — E28.2 PCOS (POLYCYSTIC OVARIAN SYNDROME): ICD-10-CM

## 2025-06-03 DIAGNOSIS — E78.2 MIXED HYPERLIPIDEMIA: ICD-10-CM

## 2025-06-03 DIAGNOSIS — E66.09 CLASS 1 OBESITY DUE TO EXCESS CALORIES WITH SERIOUS COMORBIDITY AND BODY MASS INDEX (BMI) OF 31.0 TO 31.9 IN ADULT: ICD-10-CM

## 2025-06-03 DIAGNOSIS — E65 ABDOMINAL OBESITY: ICD-10-CM

## 2025-06-03 DIAGNOSIS — E03.9 HYPOTHYROIDISM, UNSPECIFIED TYPE: ICD-10-CM

## 2025-06-03 DIAGNOSIS — I10 PRIMARY HYPERTENSION: Primary | ICD-10-CM

## 2025-06-03 DIAGNOSIS — Z71.82 EXERCISE COUNSELING: ICD-10-CM

## 2025-06-03 PROCEDURE — 3074F SYST BP LT 130 MM HG: CPT | Performed by: PHYSICIAN ASSISTANT

## 2025-06-03 PROCEDURE — 99205 OFFICE O/P NEW HI 60 MIN: CPT | Performed by: PHYSICIAN ASSISTANT

## 2025-06-03 PROCEDURE — 3078F DIAST BP <80 MM HG: CPT | Performed by: PHYSICIAN ASSISTANT

## 2025-06-03 RX ORDER — AMLODIPINE BESYLATE 10 MG/1
TABLET ORAL
COMMUNITY

## 2025-06-03 RX ORDER — THYROID 60 MG/1
TABLET ORAL
COMMUNITY

## 2025-06-03 RX ORDER — ONDANSETRON 8 MG/1
TABLET, FILM COATED ORAL
COMMUNITY

## 2025-06-03 NOTE — PROGRESS NOTES
[facility]                 Comprehensive Medical & Surgical Weight Loss       Date of visit: 6/3/2025      History and Physical    Patient: Gypsy Layton MRN: [id number]     YOB: 1995  Age: 29 y.o.  Sex: female      Gypsy Layton  who presents to the [facility] for consultation to assist in weight loss.  This is her initial consultation preparing her for our multi-disciplinary weight loss program.  She presents with a height of 1.626 m (5' 4\") and weight of 82.6 kg (182 lb), giving her a Body mass index is 31.24 kg/m².  She has an ideal body weight of 146 lbs, and excess body weight of 36 lbs.      Last Non-Surgical Weight Loss:      6/3/2025     2:48 PM   Non-Surgical Weight Loss Tracker   Consult Date 6/3/2025   Initial Height 5' 4\"   Initial Weight 182 lbs   Ideal Body Weight 146 lb   Initial BMI 31.2   Initial EBW 36 lb   Initial Neck Circumference 14   Initial Waist Circumference 38   Initial Hip Circumference 44   Is patient taking anti-obesity Meds? No        Initial Neck circumference - 14\"  Initial Waist circumference - 38\"  Initial Hip Circumference - 44\"    Lowest weight 165 lbs  Highest weight 215 lbs  Biggest challenge to weight loss - PCOS / Hashimoto's, eating 2 meals per day, night snacking    MEDICAL HISTORY:  Morbid Obesity   Depression  Anxiety  ADHD  Hypothyroidism  PCOS  Back pain    Comorbidity Yes or No   Hypertension- how many medications = 1 Yes   Hyperlipidemia Yes   Diabetes Mellitus  Insulin dependent = No  Last A1c = 5.3 No   Coronary Artery Disease No   Gastroesophageal Reflux No   Obstructive Sleep Apnea No   Cancer No   Asthma No   Osteoarthritis No   Joint Pain Yes      Denies GERD symptoms including (-) heartburn, (-) regurgitation, (-) dysphagia.    Denies current medication regimen.  Denies previous testing including EGD.     Other Yes or No   Migraines Yes   Seizures or Epilepsy No   Glaucoma No   Hyperthyroidism No   Irregular

## 2025-06-04 RX ORDER — TRAMADOL HYDROCHLORIDE 50 MG/1
50 TABLET ORAL EVERY 6 HOURS PRN
Qty: 20 TABLET | Refills: 0 | Status: SHIPPED | OUTPATIENT
Start: 2025-06-04 | End: 2025-06-09

## 2025-06-08 NOTE — DISCHARGE INSTRUCTIONS

## 2025-06-13 ENCOUNTER — CLINICAL SUPPORT (OUTPATIENT)
Age: 30
End: 2025-06-13
Payer: MEDICAID

## 2025-06-13 DIAGNOSIS — Z01.818 PREOPERATIVE CLEARANCE: Primary | ICD-10-CM

## 2025-06-13 PROCEDURE — 93000 ELECTROCARDIOGRAM COMPLETE: CPT | Performed by: INTERNAL MEDICINE

## 2025-06-19 ENCOUNTER — LAB (OUTPATIENT)
Dept: FAMILY MEDICINE CLINIC | Facility: CLINIC | Age: 30
End: 2025-06-19

## 2025-06-19 ENCOUNTER — OFFICE VISIT (OUTPATIENT)
Dept: FAMILY MEDICINE CLINIC | Facility: CLINIC | Age: 30
End: 2025-06-19
Payer: MEDICAID

## 2025-06-19 VITALS
BODY MASS INDEX: 31.76 KG/M2 | TEMPERATURE: 97.4 F | HEART RATE: 111 BPM | WEIGHT: 185 LBS | DIASTOLIC BLOOD PRESSURE: 115 MMHG | SYSTOLIC BLOOD PRESSURE: 148 MMHG

## 2025-06-19 DIAGNOSIS — R73.02 IGT (IMPAIRED GLUCOSE TOLERANCE): ICD-10-CM

## 2025-06-19 DIAGNOSIS — F33.9 RECURRENT MAJOR DEPRESSIVE DISORDER, REMISSION STATUS UNSPECIFIED: ICD-10-CM

## 2025-06-19 DIAGNOSIS — E03.9 HYPOTHYROIDISM, UNSPECIFIED TYPE: ICD-10-CM

## 2025-06-19 DIAGNOSIS — I10 HYPERTENSION, UNSPECIFIED TYPE: ICD-10-CM

## 2025-06-19 DIAGNOSIS — F41.1 GAD (GENERALIZED ANXIETY DISORDER): ICD-10-CM

## 2025-06-19 DIAGNOSIS — M51.372 DEGENERATION OF INTERVERTEBRAL DISC OF LUMBOSACRAL REGION WITH DISCOGENIC BACK PAIN AND LOWER EXTREMITY PAIN: ICD-10-CM

## 2025-06-19 DIAGNOSIS — Z98.890 S/P LUMBAR LAMINECTOMY: ICD-10-CM

## 2025-06-19 DIAGNOSIS — F90.9 ATTENTION DEFICIT HYPERACTIVITY DISORDER (ADHD), UNSPECIFIED ADHD TYPE: ICD-10-CM

## 2025-06-19 DIAGNOSIS — R00.2 INTERMITTENT PALPITATIONS: ICD-10-CM

## 2025-06-19 DIAGNOSIS — M48.062 LUMBAR STENOSIS WITH NEUROGENIC CLAUDICATION: ICD-10-CM

## 2025-06-19 DIAGNOSIS — F90.0 ATTENTION DEFICIT HYPERACTIVITY DISORDER (ADHD), PREDOMINANTLY INATTENTIVE TYPE: ICD-10-CM

## 2025-06-19 DIAGNOSIS — E78.5 HYPERLIPIDEMIA, UNSPECIFIED HYPERLIPIDEMIA TYPE: ICD-10-CM

## 2025-06-19 DIAGNOSIS — G62.9 NEUROPATHY: ICD-10-CM

## 2025-06-19 DIAGNOSIS — M51.26 LUMBAR DISC HERNIATION: ICD-10-CM

## 2025-06-19 DIAGNOSIS — Z00.00 ANNUAL PHYSICAL EXAM: Primary | ICD-10-CM

## 2025-06-19 LAB
ALBUMIN SERPL-MCNC: 3.9 G/DL (ref 3.5–5)
ALBUMIN/GLOB SERPL: 1.1 (ref 1–1.9)
ALP SERPL-CCNC: 137 U/L (ref 35–104)
ALT SERPL-CCNC: 17 U/L (ref 8–45)
ANION GAP SERPL CALC-SCNC: 13 MMOL/L (ref 7–16)
AST SERPL-CCNC: 32 U/L (ref 15–37)
BASOPHILS # BLD: 0.1 K/UL (ref 0–0.2)
BASOPHILS NFR BLD: 1 % (ref 0–2)
BILIRUB SERPL-MCNC: 0.5 MG/DL (ref 0–1.2)
BUN SERPL-MCNC: 11 MG/DL (ref 6–23)
CALCIUM SERPL-MCNC: 9.9 MG/DL (ref 8.8–10.2)
CHLORIDE SERPL-SCNC: 105 MMOL/L (ref 98–107)
CHOLEST SERPL-MCNC: 217 MG/DL (ref 0–200)
CO2 SERPL-SCNC: 21 MMOL/L (ref 20–29)
CREAT SERPL-MCNC: 0.77 MG/DL (ref 0.6–1.1)
DIFFERENTIAL METHOD BLD: NORMAL
EOSINOPHIL # BLD: 0.19 K/UL (ref 0–0.8)
EOSINOPHIL NFR BLD: 1.8 % (ref 0.5–7.8)
ERYTHROCYTE [DISTWIDTH] IN BLOOD BY AUTOMATED COUNT: 13.5 % (ref 11.9–14.6)
EST. AVERAGE GLUCOSE BLD GHB EST-MCNC: 99 MG/DL
GLOBULIN SER CALC-MCNC: 3.7 G/DL (ref 2.3–3.5)
GLUCOSE SERPL-MCNC: 73 MG/DL (ref 70–99)
HBA1C MFR BLD: 5.1 % (ref 0–5.6)
HCT VFR BLD AUTO: 43.4 % (ref 35.8–46.3)
HDLC SERPL-MCNC: 33 MG/DL (ref 40–60)
HDLC SERPL: 6.6 (ref 0–5)
HGB BLD-MCNC: 14.6 G/DL (ref 11.7–15.4)
IMM GRANULOCYTES # BLD AUTO: 0.02 K/UL (ref 0–0.5)
IMM GRANULOCYTES NFR BLD AUTO: 0.2 % (ref 0–5)
LDLC SERPL CALC-MCNC: 132 MG/DL (ref 0–100)
LYMPHOCYTES # BLD: 3.05 K/UL (ref 0.5–4.6)
LYMPHOCYTES NFR BLD: 29 % (ref 13–44)
MCH RBC QN AUTO: 31.2 PG (ref 26.1–32.9)
MCHC RBC AUTO-ENTMCNC: 33.6 G/DL (ref 31.4–35)
MCV RBC AUTO: 92.7 FL (ref 82–102)
MONOCYTES # BLD: 0.63 K/UL (ref 0.1–1.3)
MONOCYTES NFR BLD: 6 % (ref 4–12)
NEUTS SEG # BLD: 6.51 K/UL (ref 1.7–8.2)
NEUTS SEG NFR BLD: 62 % (ref 43–78)
NRBC # BLD: 0 K/UL (ref 0–0.2)
PLATELET # BLD AUTO: 359 K/UL (ref 150–450)
PMV BLD AUTO: 11.9 FL (ref 9.4–12.3)
POTASSIUM SERPL-SCNC: 4.2 MMOL/L (ref 3.5–5.1)
PROT SERPL-MCNC: 7.6 G/DL (ref 6.3–8.2)
RBC # BLD AUTO: 4.68 M/UL (ref 4.05–5.2)
SODIUM SERPL-SCNC: 139 MMOL/L (ref 136–145)
TRIGL SERPL-MCNC: 262 MG/DL (ref 0–150)
TSH W FREE THYROID IF ABNORMAL: 2.01 UIU/ML (ref 0.27–4.2)
VLDLC SERPL CALC-MCNC: 52 MG/DL (ref 6–23)
WBC # BLD AUTO: 10.5 K/UL (ref 4.3–11.1)

## 2025-06-19 PROCEDURE — 99214 OFFICE O/P EST MOD 30 MIN: CPT | Performed by: FAMILY MEDICINE

## 2025-06-19 PROCEDURE — 3080F DIAST BP >= 90 MM HG: CPT | Performed by: FAMILY MEDICINE

## 2025-06-19 PROCEDURE — 3077F SYST BP >= 140 MM HG: CPT | Performed by: FAMILY MEDICINE

## 2025-06-19 PROCEDURE — 99395 PREV VISIT EST AGE 18-39: CPT | Performed by: FAMILY MEDICINE

## 2025-06-19 RX ORDER — DEXTROAMPHETAMINE SACCHARATE, AMPHETAMINE ASPARTATE MONOHYDRATE, DEXTROAMPHETAMINE SULFATE AND AMPHETAMINE SULFATE 5; 5; 5; 5 MG/1; MG/1; MG/1; MG/1
20 CAPSULE, EXTENDED RELEASE ORAL EVERY MORNING
Qty: 31 CAPSULE | Refills: 0 | Status: CANCELLED | OUTPATIENT
Start: 2025-06-19 | End: 2025-09-27

## 2025-06-19 RX ORDER — PREGABALIN 100 MG/1
100 CAPSULE ORAL 3 TIMES DAILY
Qty: 90 CAPSULE | Refills: 2 | Status: SHIPPED | OUTPATIENT
Start: 2025-06-19 | End: 2026-07-24

## 2025-06-19 RX ORDER — TRAMADOL HYDROCHLORIDE 50 MG/1
50 TABLET ORAL EVERY 8 HOURS PRN
Qty: 90 TABLET | Refills: 2 | Status: SHIPPED | OUTPATIENT
Start: 2025-06-19 | End: 2026-07-24

## 2025-06-19 RX ORDER — SERTRALINE HYDROCHLORIDE 100 MG/1
100 TABLET, FILM COATED ORAL DAILY
COMMUNITY
Start: 2025-05-25 | End: 2025-06-19 | Stop reason: ALTCHOICE

## 2025-06-19 RX ORDER — METOPROLOL SUCCINATE 50 MG/1
50 TABLET, EXTENDED RELEASE ORAL
Qty: 30 TABLET | Refills: 2 | Status: SHIPPED | OUTPATIENT
Start: 2025-06-19

## 2025-06-19 RX ORDER — SERTRALINE HYDROCHLORIDE 100 MG/1
200 TABLET, FILM COATED ORAL EVERY MORNING
Qty: 60 TABLET | Refills: 2 | Status: SHIPPED | OUTPATIENT
Start: 2025-06-19

## 2025-06-19 RX ORDER — DEXTROAMPHETAMINE SACCHARATE, AMPHETAMINE ASPARTATE MONOHYDRATE, DEXTROAMPHETAMINE SULFATE AND AMPHETAMINE SULFATE 5; 5; 5; 5 MG/1; MG/1; MG/1; MG/1
20 CAPSULE, EXTENDED RELEASE ORAL EVERY MORNING
Qty: 30 CAPSULE | Refills: 0 | Status: CANCELLED | OUTPATIENT
Start: 2025-06-19 | End: 2025-09-27

## 2025-06-19 ASSESSMENT — PATIENT HEALTH QUESTIONNAIRE - PHQ9
3. TROUBLE FALLING OR STAYING ASLEEP: NOT AT ALL
5. POOR APPETITE OR OVEREATING: NOT AT ALL
2. FEELING DOWN, DEPRESSED OR HOPELESS: NOT AT ALL
9. THOUGHTS THAT YOU WOULD BE BETTER OFF DEAD, OR OF HURTING YOURSELF: NOT AT ALL
4. FEELING TIRED OR HAVING LITTLE ENERGY: NOT AT ALL
7. TROUBLE CONCENTRATING ON THINGS, SUCH AS READING THE NEWSPAPER OR WATCHING TELEVISION: NOT AT ALL
6. FEELING BAD ABOUT YOURSELF - OR THAT YOU ARE A FAILURE OR HAVE LET YOURSELF OR YOUR FAMILY DOWN: NOT AT ALL
10. IF YOU CHECKED OFF ANY PROBLEMS, HOW DIFFICULT HAVE THESE PROBLEMS MADE IT FOR YOU TO DO YOUR WORK, TAKE CARE OF THINGS AT HOME, OR GET ALONG WITH OTHER PEOPLE: NOT DIFFICULT AT ALL
SUM OF ALL RESPONSES TO PHQ QUESTIONS 1-9: 0
8. MOVING OR SPEAKING SO SLOWLY THAT OTHER PEOPLE COULD HAVE NOTICED. OR THE OPPOSITE, BEING SO FIGETY OR RESTLESS THAT YOU HAVE BEEN MOVING AROUND A LOT MORE THAN USUAL: NOT AT ALL

## 2025-06-19 ASSESSMENT — ENCOUNTER SYMPTOMS
BLOOD IN STOOL: 0
COLOR CHANGE: 0
DIARRHEA: 0
ABDOMINAL PAIN: 0
CONSTIPATION: 0
SHORTNESS OF BREATH: 0
BACK PAIN: 1

## 2025-06-19 NOTE — ASSESSMENT & PLAN NOTE
Problem and/or Symptoms are currently not stable and/or well controlled on current treatment plan. Will have patient follow up as directed and make the following changes for further evaluation and/or treatment:     Advised patient to check home BP & HR BID X 2 wks, then send BP log or average reading to office for review. Will make adjustments to BP Tx regimen then as appropriate.    For today will start pt on Toprol 50 mg QHS & f/u response next visit

## 2025-06-19 NOTE — ASSESSMENT & PLAN NOTE
Problem and/or Symptoms are currently not stable and/or well controlled on current treatment plan. Will have patient follow up as directed and make the following changes for further evaluation and/or treatment:     Pt is seeing Ortho / Spine specialist but they are not managing medications... Will provide pt with Lyrica at a higher dose regimen of 100 mg TID plus low dose of Tramadol 50 mg TID PRN.

## 2025-06-19 NOTE — PROGRESS NOTES
mg, Disp-90 tablet, R-2Normal  15. Lumbar stenosis with neurogenic claudication      Check Out Instructions  Return in about 3 months (around 10/3/2025) for VV (Virtual Visit).      Subjective & Objective    HPI  Patient is seen today for 2 encounters, the first being an annual exam that includes preventative health advice & screenings for chronic disease and/or cancer, and vaccination recommendations. Patient is also being seen today for addressing other health problems as needed, for review (or ordering) of pertinent labs as needed, and ordering of pertinent medications as needed.      Review of Systems   Constitutional:  Positive for fatigue. Negative for unexpected weight change.   Eyes:  Negative for visual disturbance.   Respiratory:  Negative for shortness of breath.    Cardiovascular:  Negative for chest pain.   Gastrointestinal:  Negative for abdominal pain, blood in stool, constipation and diarrhea.   Genitourinary:  Negative for difficulty urinating, hematuria and menstrual problem.   Musculoskeletal:  Positive for arthralgias, back pain and myalgias.   Skin:  Negative for color change.   Neurological:  Positive for headaches. Negative for weakness.   Psychiatric/Behavioral:  Positive for decreased concentration and dysphoric mood. The patient is nervous/anxious.        Physical Exam  Vitals and nursing note reviewed.   Constitutional:       Appearance: Normal appearance.   HENT:      Head: Normocephalic and atraumatic.   Neck:      Trachea: Phonation normal.   Cardiovascular:      Rate and Rhythm: Normal rate and regular rhythm.      Pulses: Normal pulses.      Heart sounds: Normal heart sounds.   Pulmonary:      Effort: Pulmonary effort is normal. No respiratory distress.      Breath sounds: Normal breath sounds.   Musculoskeletal:         General: No deformity.   Skin:     Findings: No rash.   Neurological:      Mental Status: She is alert. Mental status is at baseline.   Psychiatric:         Mood and

## 2025-06-19 NOTE — ASSESSMENT & PLAN NOTE
Problem and/or Symptoms are currently not stable and/or well controlled on current treatment plan. Will have patient follow up as directed and make the following changes for further evaluation and/or treatment:     Same plan as for EDD

## 2025-06-19 NOTE — ASSESSMENT & PLAN NOTE
Problem and/or Symptoms are currently not stable and/or well controlled on current treatment plan. Will have patient follow up as directed and make the following changes for further evaluation and/or treatment:     Same plan as for DDD

## 2025-06-19 NOTE — ASSESSMENT & PLAN NOTE
Problem and/or Symptoms are currently not stable and/or well controlled on current treatment plan. Will have patient follow up as directed and make the following changes for further evaluation and/or treatment:     Due to elevated BP & HR, will hold stimulant medication until receiving average/baseline BP & HR in 2 wks; if normal at that time will plan to resume ADHD Tx regimen.

## 2025-06-19 NOTE — ASSESSMENT & PLAN NOTE
Problem and/or Symptoms are currently not stable and/or well controlled on current treatment plan. Will have patient follow up as directed and make the following changes for further evaluation and/or treatment:     Increasing Zoloft from 100 to 200 mg QD & f/u in 3 M

## 2025-06-19 NOTE — ASSESSMENT & PLAN NOTE
Problem and/or Symptoms are currently not stable and/or well controlled on current treatment plan. Will have patient follow up as directed and make the following changes for further evaluation and/or treatment:     Same plan as for HTN

## 2025-06-23 ENCOUNTER — RESULTS FOLLOW-UP (OUTPATIENT)
Dept: FAMILY MEDICINE CLINIC | Facility: CLINIC | Age: 30
End: 2025-06-23

## 2025-06-23 NOTE — RESULT ENCOUNTER NOTE
Lab results are overall normal except for higher than ideal cholesterol, but not to the point any new or different medication is indicated at this time.    For a low cholesterol diet, tried to avoid or decrease your consumption of 4 types of foods: 1. Red Meat  2. Shellfish  3. Fried Foods  4. Full-Fat Dairy.  These 4 types are typically the highest in cholesterol concentration. Other than that, focus on eating a healthy diet rich in fresh vegetables, lean meats, and engage in regular exercise. This will help to achieve or maintain a healthy body weight, which also helps keep high cholesterol under control.    Take care,  Dr Camacho

## 2025-06-24 ENCOUNTER — OFFICE VISIT (OUTPATIENT)
Age: 30
End: 2025-06-24
Payer: MEDICAID

## 2025-06-24 DIAGNOSIS — M96.1 POSTLAMINECTOMY SYNDROME OF LUMBAR REGION: Primary | ICD-10-CM

## 2025-06-24 DIAGNOSIS — M51.369 BULGE OF LUMBAR DISC WITHOUT MYELOPATHY: ICD-10-CM

## 2025-06-24 DIAGNOSIS — M51.362 DEGENERATION OF INTERVERTEBRAL DISC OF LUMBAR REGION WITH DISCOGENIC BACK PAIN AND LOWER EXTREMITY PAIN: ICD-10-CM

## 2025-06-24 PROCEDURE — 99214 OFFICE O/P EST MOD 30 MIN: CPT | Performed by: PHYSICIAN ASSISTANT

## 2025-06-24 NOTE — PROGRESS NOTES
Name: Gypsy Layton  YOB: 1995  Gender: female  MRN: [id number]    CC: Results (MRI Lumbar spine)       History of Present Illness  The patient is a 29-year-old female presenting with persistent back pain.    She has a history of left L5-S1 hemilaminectomy with discectomy in  and revision with left L4 hemilaminectomy in  with Dr. Tamayo. She recently had a  section in 2025. The pain, described as dull and radiating down her leg, is bilateral but more pronounced on the left side.  Upper back pain is noted, particularly under the bra strap. The pain persists despite clearance for regular activity post- section.    Previous physical therapy, injections, and pain management were ineffective. Her current regimen includes Tylenol, pregabalin, Aleve, Advil, with no relief. She has a high opioid tolerance and previously took high-dose tramadol. She is not breastfeeding. The pain worsens with activity and prolonged standing or walking. She fell during pregnancy due to her legs giving way upon rising from bed.    PAST SURGICAL HISTORY: She has had a left L5-S1 hemilaminectomy with discectomy in , a left L4 hemilaminectomy in , and a  section in 2025.      The patient presents for a follow-up to review lumbar spine MRI results.    She reports experiencing lower back pain that radiates down her left leg, described as a burning sensation, and a burning sensation in bilateral hips.. This pain significantly impacts her daily life, causing debilitating pain and mental distress. She notes that previous injections were ineffective.                  2025     1:57 PM   AMB PAIN ASSESSMENT   Location of Pain Back    Location Modifiers Posterior;Medial   Severity of Pain 6   Quality of Pain Aching    Duration of Pain Persistent   Frequency of Pain Intermittent   Aggravating Factors Bending;Standing;Walking;Other (Comment)    Limiting Behavior Some

## 2025-06-27 ENCOUNTER — TELEMEDICINE (OUTPATIENT)
Dept: SURGERY | Age: 30
End: 2025-06-27

## 2025-06-27 DIAGNOSIS — Z71.3 NUTRITIONAL COUNSELING: Primary | ICD-10-CM

## 2025-07-16 ENCOUNTER — OFFICE VISIT (OUTPATIENT)
Dept: SURGERY | Age: 30
End: 2025-07-16
Payer: MEDICAID

## 2025-07-16 VITALS
BODY MASS INDEX: 30.73 KG/M2 | SYSTOLIC BLOOD PRESSURE: 169 MMHG | WEIGHT: 180 LBS | DIASTOLIC BLOOD PRESSURE: 124 MMHG | HEART RATE: 103 BPM | HEIGHT: 64 IN

## 2025-07-16 DIAGNOSIS — E66.09 CLASS 1 OBESITY DUE TO EXCESS CALORIES WITH SERIOUS COMORBIDITY AND BODY MASS INDEX (BMI) OF 31.0 TO 31.9 IN ADULT: ICD-10-CM

## 2025-07-16 DIAGNOSIS — Z71.3 NUTRITIONAL COUNSELING: ICD-10-CM

## 2025-07-16 DIAGNOSIS — E78.2 MIXED HYPERLIPIDEMIA: ICD-10-CM

## 2025-07-16 DIAGNOSIS — I10 PRIMARY HYPERTENSION: Primary | ICD-10-CM

## 2025-07-16 DIAGNOSIS — E03.9 HYPOTHYROIDISM, UNSPECIFIED TYPE: ICD-10-CM

## 2025-07-16 DIAGNOSIS — E65 ABDOMINAL OBESITY: ICD-10-CM

## 2025-07-16 DIAGNOSIS — Z71.82 EXERCISE COUNSELING: ICD-10-CM

## 2025-07-16 DIAGNOSIS — E66.811 CLASS 1 OBESITY DUE TO EXCESS CALORIES WITH SERIOUS COMORBIDITY AND BODY MASS INDEX (BMI) OF 31.0 TO 31.9 IN ADULT: ICD-10-CM

## 2025-07-16 DIAGNOSIS — E28.2 PCOS (POLYCYSTIC OVARIAN SYNDROME): ICD-10-CM

## 2025-07-16 PROCEDURE — 3077F SYST BP >= 140 MM HG: CPT | Performed by: SURGERY

## 2025-07-16 PROCEDURE — 3080F DIAST BP >= 90 MM HG: CPT | Performed by: SURGERY

## 2025-07-16 PROCEDURE — 99215 OFFICE O/P EST HI 40 MIN: CPT | Performed by: SURGERY

## 2025-07-16 NOTE — PROGRESS NOTES
Tamika Villanueva MD                 Comprehensive Medical & Surgical Weight Loss       Date of visit: 7/16/2025      History and Physical    Patient: Gypsy Layton MRN: [id number]     YOB: 1995  Age: 30 y.o.  Sex: female        Gypsy Layton  who presents to the [facility] for follow up after initial consultation to assist in weight loss.  We will review blood work results, medication options and discuss further our multi-disciplinary weight loss program.      She presents with a height of 1.626 m (5' 4\") and weight of 81.6 kg (180 lb), giving her a Body mass index is 30.9 kg/m²..      Last Non-Surgical Weight Loss:      7/16/2025     7:57 AM 6/3/2025     2:48 PM   Non-Surgical Weight Loss Tracker   Consult Date 6/3/2025 6/3/2025   Initial Height 5' 4\" 5' 4\"   Initial Weight 182 lbs 182 lbs   Ideal Body Weight 146 lb 146 lb   Initial BMI 31.2 31.2   Initial EBW 36 lb 36 lb   Non-Surgical Initial % Body Fat  38.3%    Initial Neck Circumference 14 14   Initial Waist Circumference 38 38   Initial Hip Circumference 44 44   Is patient taking anti-obesity Meds? No No   Date 7/16/2025    Weight 180 lb    BMI 30.9    Weight Change since last Visit -2 lb    Weight Change since Initial Consult -2 lb    % EBWL 6%    % Total Body Weight Loss  1%            Lowest weight 165 lbs  Highest weight 215 lbs  Biggest challenge to weight loss - PCOS / Hashimoto's, eating 2 meals per day, night snacking    MEDICAL HISTORY:  Morbid Obesity   Depression  Anxiety  ADHD  Hypothyroidism  PCOS  Back pain    Comorbidity Yes or No   Hypertension- how many medications = 1 Yes   Hyperlipidemia Yes, no meds   Diabetes Mellitus  Insulin dependent = No  Last A1c = 5.3 No   Coronary Artery Disease No   Gastroesophageal Reflux No   Obstructive Sleep Apnea No   Cancer No   Asthma No   Osteoarthritis No   Joint Pain Yes      Denies GERD symptoms including (-) heartburn, (-) regurgitation, (-) dysphagia.

## 2025-07-19 PROBLEM — Z00.00 ANNUAL PHYSICAL EXAM: Status: RESOLVED | Noted: 2022-08-17 | Resolved: 2025-07-19

## 2025-07-21 DIAGNOSIS — E66.811 CLASS 1 OBESITY DUE TO EXCESS CALORIES WITH SERIOUS COMORBIDITY AND BODY MASS INDEX (BMI) OF 31.0 TO 31.9 IN ADULT: ICD-10-CM

## 2025-07-21 DIAGNOSIS — I10 PRIMARY HYPERTENSION: ICD-10-CM

## 2025-07-21 DIAGNOSIS — E66.09 CLASS 1 OBESITY DUE TO EXCESS CALORIES WITH SERIOUS COMORBIDITY AND BODY MASS INDEX (BMI) OF 31.0 TO 31.9 IN ADULT: ICD-10-CM

## 2025-07-21 DIAGNOSIS — E28.2 PCOS (POLYCYSTIC OVARIAN SYNDROME): Primary | ICD-10-CM

## 2025-07-21 RX ORDER — SEMAGLUTIDE 0.25 MG/.5ML
0.25 INJECTION, SOLUTION SUBCUTANEOUS
Qty: 2 ML | Refills: 0 | Status: SHIPPED | OUTPATIENT
Start: 2025-07-21 | End: 2025-08-20

## 2025-07-22 ENCOUNTER — TELEPHONE (OUTPATIENT)
Dept: FAMILY MEDICINE CLINIC | Facility: CLINIC | Age: 30
End: 2025-07-22

## 2025-07-22 NOTE — TELEPHONE ENCOUNTER
Per pt:   Hi,     I have started the new bp medication but I have been experiencing really bad headaches and vertigo. Almost to the point I can’t function. I used to take amlodipine is that not something we can revisit? Would you like a vv with the pt. Please advise tks :)

## 2025-07-23 NOTE — TELEPHONE ENCOUNTER
As previously mentioned a few weeks ago, we need her VITALS with a concern regarding a BP medication; both a BP & Pulse, to know how to respond. Pt can either report these via [facility] message or schedule an IP visit to have vitals checked in office. The BEST thing to do though is to check BP & Pulse 2-3 X a day for a week or two so we can see what the average/baseline is.     If pt is feeling lightheaded or fatigued & her BP/Pulse is LOW (<100/50 for BP or < 50 for HR) then she should cut her dose of Carvedilol in 1/2. Again though, anytime we have a pt issue with a BP med we need the pertinent VITALS in order to respond appropriately.

## 2025-08-01 ENCOUNTER — APPOINTMENT (OUTPATIENT)
Dept: PHYSICAL THERAPY | Age: 30
End: 2025-08-01
Payer: MEDICAID

## 2025-08-15 ENCOUNTER — APPOINTMENT (OUTPATIENT)
Dept: PHYSICAL THERAPY | Age: 30
End: 2025-08-15
Payer: MEDICAID

## 2025-08-19 ENCOUNTER — OFFICE VISIT (OUTPATIENT)
Dept: SURGERY | Age: 30
End: 2025-08-19
Payer: MEDICAID

## 2025-08-19 VITALS
WEIGHT: 179 LBS | HEART RATE: 76 BPM | BODY MASS INDEX: 30.56 KG/M2 | DIASTOLIC BLOOD PRESSURE: 82 MMHG | HEIGHT: 64 IN | SYSTOLIC BLOOD PRESSURE: 144 MMHG

## 2025-08-19 DIAGNOSIS — Z71.3 NUTRITIONAL COUNSELING: ICD-10-CM

## 2025-08-19 DIAGNOSIS — I10 PRIMARY HYPERTENSION: Primary | ICD-10-CM

## 2025-08-19 DIAGNOSIS — E28.2 PCOS (POLYCYSTIC OVARIAN SYNDROME): ICD-10-CM

## 2025-08-19 DIAGNOSIS — E65 ABDOMINAL OBESITY: ICD-10-CM

## 2025-08-19 DIAGNOSIS — Z71.82 EXERCISE COUNSELING: ICD-10-CM

## 2025-08-19 DIAGNOSIS — E66.811 CLASS 1 OBESITY DUE TO EXCESS CALORIES WITH SERIOUS COMORBIDITY AND BODY MASS INDEX (BMI) OF 31.0 TO 31.9 IN ADULT: ICD-10-CM

## 2025-08-19 DIAGNOSIS — E66.09 CLASS 1 OBESITY DUE TO EXCESS CALORIES WITH SERIOUS COMORBIDITY AND BODY MASS INDEX (BMI) OF 31.0 TO 31.9 IN ADULT: ICD-10-CM

## 2025-08-19 DIAGNOSIS — E78.2 MIXED HYPERLIPIDEMIA: ICD-10-CM

## 2025-08-19 DIAGNOSIS — E03.9 HYPOTHYROIDISM, UNSPECIFIED TYPE: ICD-10-CM

## 2025-08-19 PROCEDURE — 99215 OFFICE O/P EST HI 40 MIN: CPT | Performed by: SURGERY

## 2025-08-19 PROCEDURE — 3079F DIAST BP 80-89 MM HG: CPT | Performed by: SURGERY

## 2025-08-19 PROCEDURE — 3077F SYST BP >= 140 MM HG: CPT | Performed by: SURGERY

## 2025-08-21 ENCOUNTER — HOSPITAL ENCOUNTER (OUTPATIENT)
Dept: PHYSICAL THERAPY | Age: 30
Setting detail: RECURRING SERIES
Discharge: HOME | End: 2025-08-24
Payer: MEDICAID

## 2025-08-21 DIAGNOSIS — M62.81 MUSCLE WEAKNESS (GENERALIZED): ICD-10-CM

## 2025-08-21 DIAGNOSIS — M25.69 STIFFNESS OF OTHER SPECIFIED JOINT, NOT ELSEWHERE CLASSIFIED: ICD-10-CM

## 2025-08-21 DIAGNOSIS — R26.89 OTHER ABNORMALITIES OF GAIT AND MOBILITY: ICD-10-CM

## 2025-08-21 DIAGNOSIS — M54.59 OTHER LOW BACK PAIN: Primary | ICD-10-CM

## 2025-08-21 PROCEDURE — 97161 PT EVAL LOW COMPLEX 20 MIN: CPT

## 2025-08-21 ASSESSMENT — PAIN SCALES - GENERAL: PAINLEVEL_OUTOF10: 4

## 2025-08-27 ENCOUNTER — APPOINTMENT (OUTPATIENT)
Dept: PHYSICAL THERAPY | Age: 30
End: 2025-08-27
Payer: MEDICAID

## 2025-08-29 DIAGNOSIS — M51.372 DEGENERATION OF INTERVERTEBRAL DISC OF LUMBOSACRAL REGION WITH DISCOGENIC BACK PAIN AND LOWER EXTREMITY PAIN: Primary | ICD-10-CM

## 2025-09-03 ENCOUNTER — TELEMEDICINE (OUTPATIENT)
Dept: FAMILY MEDICINE CLINIC | Facility: CLINIC | Age: 30
End: 2025-09-03
Payer: MEDICAID

## 2025-09-03 DIAGNOSIS — G43.909 MIGRAINE WITHOUT STATUS MIGRAINOSUS, NOT INTRACTABLE, UNSPECIFIED MIGRAINE TYPE: ICD-10-CM

## 2025-09-03 DIAGNOSIS — G62.9 NEUROPATHY: ICD-10-CM

## 2025-09-03 DIAGNOSIS — R00.2 INTERMITTENT PALPITATIONS: ICD-10-CM

## 2025-09-03 DIAGNOSIS — I10 HYPERTENSION, UNSPECIFIED TYPE: Primary | ICD-10-CM

## 2025-09-03 DIAGNOSIS — M51.372 DEGENERATION OF INTERVERTEBRAL DISC OF LUMBOSACRAL REGION WITH DISCOGENIC BACK PAIN AND LOWER EXTREMITY PAIN: ICD-10-CM

## 2025-09-03 DIAGNOSIS — F41.1 GAD (GENERALIZED ANXIETY DISORDER): ICD-10-CM

## 2025-09-03 DIAGNOSIS — F32.9 MAJOR DEPRESSIVE DISORDER, REMISSION STATUS UNSPECIFIED, UNSPECIFIED WHETHER RECURRENT: ICD-10-CM

## 2025-09-03 PROCEDURE — 99214 OFFICE O/P EST MOD 30 MIN: CPT | Performed by: FAMILY MEDICINE

## 2025-09-03 RX ORDER — CARVEDILOL 25 MG/1
25 TABLET ORAL 2 TIMES DAILY
Qty: 60 TABLET | Refills: 0 | Status: SHIPPED | OUTPATIENT
Start: 2025-09-03

## 2025-09-03 RX ORDER — TRAMADOL HYDROCHLORIDE 50 MG/1
50 TABLET ORAL EVERY 8 HOURS PRN
Qty: 90 TABLET | Refills: 0 | Status: SHIPPED | OUTPATIENT
Start: 2025-09-03 | End: 2026-10-08

## 2025-09-03 ASSESSMENT — PATIENT HEALTH QUESTIONNAIRE - PHQ9
1. LITTLE INTEREST OR PLEASURE IN DOING THINGS: NOT AT ALL
7. TROUBLE CONCENTRATING ON THINGS, SUCH AS READING THE NEWSPAPER OR WATCHING TELEVISION: NOT AT ALL
6. FEELING BAD ABOUT YOURSELF - OR THAT YOU ARE A FAILURE OR HAVE LET YOURSELF OR YOUR FAMILY DOWN: NOT AT ALL
8. MOVING OR SPEAKING SO SLOWLY THAT OTHER PEOPLE COULD HAVE NOTICED. OR THE OPPOSITE, BEING SO FIGETY OR RESTLESS THAT YOU HAVE BEEN MOVING AROUND A LOT MORE THAN USUAL: NOT AT ALL
2. FEELING DOWN, DEPRESSED OR HOPELESS: NOT AT ALL
SUM OF ALL RESPONSES TO PHQ QUESTIONS 1-9: 0
4. FEELING TIRED OR HAVING LITTLE ENERGY: NOT AT ALL
SUM OF ALL RESPONSES TO PHQ QUESTIONS 1-9: 0
9. THOUGHTS THAT YOU WOULD BE BETTER OFF DEAD, OR OF HURTING YOURSELF: NOT AT ALL
5. POOR APPETITE OR OVEREATING: NOT AT ALL
SUM OF ALL RESPONSES TO PHQ QUESTIONS 1-9: 0
10. IF YOU CHECKED OFF ANY PROBLEMS, HOW DIFFICULT HAVE THESE PROBLEMS MADE IT FOR YOU TO DO YOUR WORK, TAKE CARE OF THINGS AT HOME, OR GET ALONG WITH OTHER PEOPLE: NOT DIFFICULT AT ALL
SUM OF ALL RESPONSES TO PHQ QUESTIONS 1-9: 0
3. TROUBLE FALLING OR STAYING ASLEEP: NOT AT ALL

## (undated) DEVICE — 3M™ STERI-DRAPE™ INSTRUMENT POUCH 1018: Brand: STERI-DRAPE™

## (undated) DEVICE — 3M™ TEGADERM™ TRANSPARENT FILM DRESSING FRAME STYLE, 1626W, 4 IN X 4-3/4 IN (10 CM X 12 CM), 50/CT 4CT/CASE: Brand: 3M™ TEGADERM™

## (undated) DEVICE — MASTISOL ADHESIVE LIQ 2/3ML

## (undated) DEVICE — SHEET, T, LAPAROTOMY, STERILE: Brand: MEDLINE

## (undated) DEVICE — GARMENT,MEDLINE,DVT,INT,CALF,LG, GEN2: Brand: MEDLINE

## (undated) DEVICE — BAND RUB 1/8X2.5IN STRL --

## (undated) DEVICE — KIT POS W/ FOAM ARM CRADL SHEARGUARD CHST PD CVR FOR SPNL

## (undated) DEVICE — AMD ANTIMICROBIAL GAUZE SPONGES,12 PLY USP TYPE VII, 0.2% POLYHEXAMETHYLENE BIGUANIDE HCI (PHMB): Brand: CURITY

## (undated) DEVICE — DRAPE MICSCP W51XL150IN FOR LEICA M680 WILD OHS

## (undated) DEVICE — CARDINAL HEALTH FLEXIBLE LIGHT HANDLE COVER: Brand: CARDINAL HEALTH

## (undated) DEVICE — POSTERIOR LAMI VANPLT-LUCAS: Brand: MEDLINE INDUSTRIES, INC.

## (undated) DEVICE — REM POLYHESIVE ADULT PATIENT RETURN ELECTRODE: Brand: VALLEYLAB

## (undated) DEVICE — 1010 S-DRAPE TOWEL DRAPE 10/BX: Brand: STERI-DRAPE™

## (undated) DEVICE — JACKSON TABLE POSITIONER KIT: Brand: MEDLINE INDUSTRIES, INC.

## (undated) DEVICE — INTENDED FOR TISSUE SEPARATION, AND OTHER PROCEDURES THAT REQUIRE A SHARP SURGICAL BLADE TO PUNCTURE OR CUT.: Brand: BARD-PARKER SAFETY BLADES SIZE 10, STERILE

## (undated) DEVICE — PACKING 8004003 NEURAY 200PK 25X25MM: Brand: NEURAY ®

## (undated) DEVICE — NEEDLE HYPO 21GA L1.5IN INTRAMUSCULAR S STL LATCH BVL UP

## (undated) DEVICE — AGENT HEMSTAT 8ML FLX TIP MTRX + DISP SURGIFLO

## (undated) DEVICE — SUTURE VCRL SZ 2-0 L27IN ABSRB UD L36MM CP-1 1/2 CIR REV J266H

## (undated) DEVICE — INTENDED FOR TISSUE SEPARATION, AND OTHER PROCEDURES THAT REQUIRE A SHARP SURGICAL BLADE TO PUNCTURE OR CUT.: Brand: BARD-PARKER SAFETY BLADES SIZE 15, STERILE

## (undated) DEVICE — 5.0MM PRECISION ROUND

## (undated) DEVICE — STRIP SKIN CLSR W1XL5IN NYL REINF CURAD

## (undated) DEVICE — 2000CC GUARDIAN II: Brand: GUARDIAN

## (undated) DEVICE — 3M™ IOBAN™ 2 ANTIMICROBIAL INCISE DRAPE 6650EZ: Brand: IOBAN™ 2

## (undated) DEVICE — SUTURE VCRL + 3-0 L27IN ABSRB UD PS-2 L19MM 3/8 CIR PRIM VCP427H

## (undated) DEVICE — SUTURE VCRL SZ 1 L27IN ABSRB UD L36MM CP-1 1/2 CIR REV CUT J268H

## (undated) DEVICE — SOLUTION IV 1000ML 0.9% SOD CHL

## (undated) DEVICE — SYR 10ML LUER LOK 1/5ML GRAD --

## (undated) DEVICE — DRAPE XR C ARM 41X74IN LF --

## (undated) DEVICE — BLADE ASSEMB CLP HAIR FINE --

## (undated) DEVICE — WAX SURG 2.5GM HEMSTAT BNE BEESWAX PARAFFIN ISO PALMITATE

## (undated) DEVICE — PREP SKN CHLRAPRP APL 26ML STR --

## (undated) DEVICE — 3M™ STERI-STRIP™ REINFORCED ADHESIVE SKIN CLOSURES, R1548, 1 IN X 5 IN (25 MM X 125 MM), 4 STRIPS/ENVELOPE: Brand: 3M™ STERI-STRIP™

## (undated) DEVICE — DRAPE SHT 3 QTR PROXIMA 53X77 --

## (undated) DEVICE — FLOSEAL HEMOSTATIC MATRIX, 5 ML: Brand: FLOSEAL

## (undated) DEVICE — (D)PREP SKN CHLRAPRP APPL 26ML -- CONVERT TO ITEM 371833

## (undated) DEVICE — CONTAINER,SPECIMEN,O.R.STRL,4.5OZ: Brand: MEDLINE

## (undated) DEVICE — BONE WAX WHITE: Brand: BONE WAX WHITE